# Patient Record
Sex: MALE | Race: WHITE | Employment: FULL TIME | URBAN - NONMETROPOLITAN AREA
[De-identification: names, ages, dates, MRNs, and addresses within clinical notes are randomized per-mention and may not be internally consistent; named-entity substitution may affect disease eponyms.]

---

## 2017-09-19 ENCOUNTER — TELEPHONE (OUTPATIENT)
Dept: SURGERY | Age: 37
End: 2017-09-19

## 2017-09-19 ENCOUNTER — HOSPITAL ENCOUNTER (OUTPATIENT)
Dept: LAB | Age: 37
Setting detail: SPECIMEN
Discharge: HOME | End: 2017-09-19
Payer: COMMERCIAL

## 2017-09-19 ENCOUNTER — INITIAL CONSULT (OUTPATIENT)
Dept: SURGERY | Age: 37
End: 2017-09-19
Payer: COMMERCIAL

## 2017-09-19 ENCOUNTER — HOSPITAL ENCOUNTER (OUTPATIENT)
Dept: NON INVASIVE DIAGNOSTICS | Age: 37
Discharge: HOME | End: 2017-09-19
Payer: COMMERCIAL

## 2017-09-19 ENCOUNTER — HOSPITAL ENCOUNTER (OUTPATIENT)
Dept: GENERAL RADIOLOGY | Age: 37
Discharge: HOME | End: 2017-09-19
Payer: COMMERCIAL

## 2017-09-19 VITALS
HEIGHT: 75 IN | BODY MASS INDEX: 29.97 KG/M2 | HEART RATE: 94 BPM | WEIGHT: 241 LBS | SYSTOLIC BLOOD PRESSURE: 130 MMHG | DIASTOLIC BLOOD PRESSURE: 90 MMHG

## 2017-09-19 DIAGNOSIS — F17.200 SMOKER: ICD-10-CM

## 2017-09-19 DIAGNOSIS — Z01.818 PREOP TESTING: ICD-10-CM

## 2017-09-19 DIAGNOSIS — F17.200 SMOKER: Primary | ICD-10-CM

## 2017-09-19 DIAGNOSIS — K40.90 LEFT INGUINAL HERNIA: ICD-10-CM

## 2017-09-19 LAB
ALBUMIN SERPL-MCNC: 5 G/DL (ref 3.5–5.2)
ALBUMIN/GLOBULIN RATIO: 1.8 (ref 1–2.5)
ALP BLD-CCNC: 87 U/L (ref 40–129)
ALT SERPL-CCNC: 54 U/L (ref 5–41)
ANION GAP SERPL CALCULATED.3IONS-SCNC: 17 MMOL/L (ref 9–17)
AST SERPL-CCNC: 35 U/L
BILIRUB SERPL-MCNC: 0.36 MG/DL (ref 0.3–1.2)
BUN BLDV-MCNC: 16 MG/DL (ref 6–20)
BUN/CREAT BLD: 22 (ref 9–20)
CALCIUM SERPL-MCNC: 9.3 MG/DL (ref 8.6–10.4)
CHLORIDE BLD-SCNC: 99 MMOL/L (ref 98–107)
CO2: 23 MMOL/L (ref 20–31)
CREAT SERPL-MCNC: 0.72 MG/DL (ref 0.7–1.2)
GFR AFRICAN AMERICAN: >60 ML/MIN
GFR NON-AFRICAN AMERICAN: >60 ML/MIN
GFR SERPL CREATININE-BSD FRML MDRD: ABNORMAL ML/MIN/{1.73_M2}
GFR SERPL CREATININE-BSD FRML MDRD: ABNORMAL ML/MIN/{1.73_M2}
GLUCOSE BLD-MCNC: 88 MG/DL (ref 70–99)
HCT VFR BLD CALC: 48.2 % (ref 41–53)
HEMOGLOBIN: 16.3 G/DL (ref 13.5–17.5)
MCH RBC QN AUTO: 31.5 PG (ref 26–34)
MCHC RBC AUTO-ENTMCNC: 33.8 G/DL (ref 31–37)
MCV RBC AUTO: 93.3 FL (ref 80–100)
PDW BLD-RTO: 12.9 % (ref 11–14.5)
PLATELET # BLD: 190 K/UL (ref 140–450)
PMV BLD AUTO: 9.6 FL (ref 6–12)
POTASSIUM SERPL-SCNC: 4 MMOL/L (ref 3.7–5.3)
RBC # BLD: 5.17 M/UL (ref 4.5–5.9)
SODIUM BLD-SCNC: 139 MMOL/L (ref 135–144)
TOTAL PROTEIN: 7.8 G/DL (ref 6.4–8.3)
WBC # BLD: 7 K/UL (ref 3.5–11)

## 2017-09-19 PROCEDURE — 99214 OFFICE O/P EST MOD 30 MIN: CPT | Performed by: SURGERY

## 2017-09-19 PROCEDURE — 36415 COLL VENOUS BLD VENIPUNCTURE: CPT

## 2017-09-19 PROCEDURE — 93005 ELECTROCARDIOGRAM TRACING: CPT

## 2017-09-19 PROCEDURE — 85027 COMPLETE CBC AUTOMATED: CPT

## 2017-09-19 PROCEDURE — 80053 COMPREHEN METABOLIC PANEL: CPT

## 2017-09-19 PROCEDURE — 71020 XR CHEST STANDARD TWO VW: CPT

## 2017-09-19 RX ORDER — IBUPROFEN 200 MG
600 TABLET ORAL PRN
COMMUNITY

## 2017-09-19 NOTE — TELEPHONE ENCOUNTER
[facility]    Pre-Operative Evaluation/Consultation    Name:  Sukhdev Meléndez                                         Age:  40 y.o. MRN:  [id number]       :  1980   Date:  2017         Sex: male    There were no encounter diagnoses. Surgeon:  Dr. Herberth Grider  Procedure (Planned):  laparoscopic recurrent left ing hernia  Date Scheduled surgery: 2017    Attending : No att. providers found    Primary Physician:faustina  Cardiologist: None    Type of Anesthesia Requested: none    Patient Medical history:  No Known Allergies  There is no problem list on file for this patient. No past medical history on file. Past Surgical History:   Procedure Laterality Date    HERNIA REPAIR  2017    left inguinal hernia repair. Dr Shadia Martinez  2017    recurrent left inguinal hernia repair. Dr. Shadia Martinez      umbilical/right groin    TONSILLECTOMY       Social History   Substance Use Topics    Smoking status: Current Every Day Smoker     Packs/day: 0.50    Smokeless tobacco: Not on file    Alcohol use Yes      Comment: couple times week     Medications:  Current Outpatient Prescriptions   Medication Sig Dispense Refill    ibuprofen (ADVIL;MOTRIN) 200 MG tablet Take 600 mg by mouth as needed for Pain       No current facility-administered medications for this visit. Scheduled Meds:  Continuous Infusions:  PRN Meds:. Prior to Admission medications    Medication Sig Start Date End Date Taking? Authorizing Provider   ibuprofen (ADVIL;MOTRIN) 200 MG tablet Take 600 mg by mouth as needed for Pain    Historical Provider, MD     Vital Signs (Current) [unfilled]    Weight:   Wt Readings from Last 1 Encounters:   17 241 lb (109.3 kg)     Height:   Ht Readings from Last 1 Encounters:   17 6' 3\" (1.905 m)      BMI:  There is no height or weight on file to calculate BMI.   Estimated body mass index is 30.12 kg/(m^2) as calculated from the

## 2017-09-22 LAB
EKG ATRIAL RATE: 65 BPM
EKG P AXIS: 29 DEGREES
EKG P-R INTERVAL: 172 MS
EKG Q-T INTERVAL: 420 MS
EKG QRS DURATION: 100 MS
EKG QTC CALCULATION (BAZETT): 436 MS
EKG R AXIS: -40 DEGREES
EKG T AXIS: 18 DEGREES
EKG VENTRICULAR RATE: 65 BPM

## 2017-09-26 ENCOUNTER — ANESTHESIA EVENT (OUTPATIENT)
Dept: OPERATING ROOM | Age: 37
End: 2017-09-26
Payer: COMMERCIAL

## 2017-09-27 ENCOUNTER — HOSPITAL ENCOUNTER (OUTPATIENT)
Age: 37
Setting detail: OUTPATIENT SURGERY
Discharge: HOME | End: 2017-09-27
Attending: SURGERY | Admitting: SURGERY
Payer: COMMERCIAL

## 2017-09-27 ENCOUNTER — ANESTHESIA (OUTPATIENT)
Dept: OPERATING ROOM | Age: 37
End: 2017-09-27
Payer: COMMERCIAL

## 2017-09-27 VITALS
OXYGEN SATURATION: 96 % | TEMPERATURE: 98.9 F | BODY MASS INDEX: 30.04 KG/M2 | HEART RATE: 82 BPM | SYSTOLIC BLOOD PRESSURE: 147 MMHG | RESPIRATION RATE: 16 BRPM | HEIGHT: 75 IN | DIASTOLIC BLOOD PRESSURE: 87 MMHG | WEIGHT: 241.6 LBS

## 2017-09-27 VITALS — DIASTOLIC BLOOD PRESSURE: 111 MMHG | TEMPERATURE: 79.2 F | SYSTOLIC BLOOD PRESSURE: 158 MMHG | OXYGEN SATURATION: 98 %

## 2017-09-27 PROCEDURE — 6360000002 HC RX W HCPCS: Performed by: SURGERY

## 2017-09-27 PROCEDURE — 2500000003 HC RX 250 WO HCPCS: Performed by: SURGERY

## 2017-09-27 PROCEDURE — 00830 ANES HERNIA RPR LWR ABD NOS: CPT | Performed by: NURSE ANESTHETIST, CERTIFIED REGISTERED

## 2017-09-27 PROCEDURE — 2720000010 HC SURG SUPPLY STERILE: Performed by: SURGERY

## 2017-09-27 PROCEDURE — 3700000000 HC ANESTHESIA ATTENDED CARE: Performed by: SURGERY

## 2017-09-27 PROCEDURE — 7100000000 HC PACU RECOVERY - FIRST 15 MIN: Performed by: SURGERY

## 2017-09-27 PROCEDURE — 7100000010 HC PHASE II RECOVERY - FIRST 15 MIN: Performed by: SURGERY

## 2017-09-27 PROCEDURE — 3600000004 HC SURGERY LEVEL 4 BASE: Performed by: SURGERY

## 2017-09-27 PROCEDURE — 7100000011 HC PHASE II RECOVERY - ADDTL 15 MIN: Performed by: SURGERY

## 2017-09-27 PROCEDURE — 2580000003 HC RX 258: Performed by: SURGERY

## 2017-09-27 PROCEDURE — 64486 TAP BLOCK UNIL BY INJECTION: CPT | Performed by: NURSE ANESTHETIST, CERTIFIED REGISTERED

## 2017-09-27 PROCEDURE — 49651 LAP ING HERNIA REPAIR RECUR: CPT | Performed by: SURGERY

## 2017-09-27 PROCEDURE — 3700000001 HC ADD 15 MINUTES (ANESTHESIA): Performed by: SURGERY

## 2017-09-27 PROCEDURE — 3600000014 HC SURGERY LEVEL 4 ADDTL 15MIN: Performed by: SURGERY

## 2017-09-27 PROCEDURE — 6360000002 HC RX W HCPCS: Performed by: NURSE ANESTHETIST, CERTIFIED REGISTERED

## 2017-09-27 PROCEDURE — 2780000010 HC IMPLANT OTHER: Performed by: SURGERY

## 2017-09-27 PROCEDURE — C1781 MESH (IMPLANTABLE): HCPCS | Performed by: SURGERY

## 2017-09-27 PROCEDURE — 7100000001 HC PACU RECOVERY - ADDTL 15 MIN: Performed by: SURGERY

## 2017-09-27 PROCEDURE — 2500000003 HC RX 250 WO HCPCS: Performed by: NURSE ANESTHETIST, CERTIFIED REGISTERED

## 2017-09-27 DEVICE — MESH HERN L W10.8XL16CM L INGUINAL WHT POLYPR MFIL: Type: IMPLANTABLE DEVICE | Site: INGUINAL | Status: FUNCTIONAL

## 2017-09-27 RX ORDER — ONDANSETRON 2 MG/ML
INJECTION INTRAMUSCULAR; INTRAVENOUS PRN
Status: DISCONTINUED | OUTPATIENT
Start: 2017-09-27 | End: 2017-09-27 | Stop reason: SDUPTHER

## 2017-09-27 RX ORDER — KETOROLAC TROMETHAMINE 30 MG/ML
INJECTION, SOLUTION INTRAMUSCULAR; INTRAVENOUS PRN
Status: DISCONTINUED | OUTPATIENT
Start: 2017-09-27 | End: 2017-09-27 | Stop reason: SDUPTHER

## 2017-09-27 RX ORDER — ROCURONIUM BROMIDE 10 MG/ML
INJECTION, SOLUTION INTRAVENOUS PRN
Status: DISCONTINUED | OUTPATIENT
Start: 2017-09-27 | End: 2017-09-27 | Stop reason: SDUPTHER

## 2017-09-27 RX ORDER — PROPOFOL 10 MG/ML
INJECTION, EMULSION INTRAVENOUS PRN
Status: DISCONTINUED | OUTPATIENT
Start: 2017-09-27 | End: 2017-09-27 | Stop reason: SDUPTHER

## 2017-09-27 RX ORDER — HYDROCODONE BITARTRATE AND ACETAMINOPHEN 5; 325 MG/1; MG/1
1 TABLET ORAL EVERY 6 HOURS PRN
Qty: 30 TABLET | Refills: 0 | Status: SHIPPED | OUTPATIENT
Start: 2017-09-27 | End: 2022-08-23

## 2017-09-27 RX ORDER — FENTANYL CITRATE 50 UG/ML
INJECTION, SOLUTION INTRAMUSCULAR; INTRAVENOUS PRN
Status: DISCONTINUED | OUTPATIENT
Start: 2017-09-27 | End: 2017-09-27 | Stop reason: SDUPTHER

## 2017-09-27 RX ORDER — SODIUM CHLORIDE, SODIUM LACTATE, POTASSIUM CHLORIDE, CALCIUM CHLORIDE 600; 310; 30; 20 MG/100ML; MG/100ML; MG/100ML; MG/100ML
INJECTION, SOLUTION INTRAVENOUS CONTINUOUS
Status: DISCONTINUED | OUTPATIENT
Start: 2017-09-27 | End: 2017-09-27 | Stop reason: HOSPADM

## 2017-09-27 RX ORDER — BUPIVACAINE HYDROCHLORIDE 5 MG/ML
INJECTION, SOLUTION EPIDURAL; INTRACAUDAL PRN
Status: DISCONTINUED | OUTPATIENT
Start: 2017-09-27 | End: 2017-09-27 | Stop reason: SDUPTHER

## 2017-09-27 RX ORDER — GLYCOPYRROLATE 0.2 MG/ML
INJECTION INTRAMUSCULAR; INTRAVENOUS PRN
Status: DISCONTINUED | OUTPATIENT
Start: 2017-09-27 | End: 2017-09-27 | Stop reason: SDUPTHER

## 2017-09-27 RX ORDER — METOPROLOL TARTRATE 5 MG/5ML
INJECTION INTRAVENOUS PRN
Status: DISCONTINUED | OUTPATIENT
Start: 2017-09-27 | End: 2017-09-27 | Stop reason: SDUPTHER

## 2017-09-27 RX ORDER — MIDAZOLAM HYDROCHLORIDE 1 MG/ML
INJECTION INTRAMUSCULAR; INTRAVENOUS PRN
Status: DISCONTINUED | OUTPATIENT
Start: 2017-09-27 | End: 2017-09-27 | Stop reason: SDUPTHER

## 2017-09-27 RX ORDER — DEXAMETHASONE SODIUM PHOSPHATE 4 MG/ML
INJECTION, SOLUTION INTRA-ARTICULAR; INTRALESIONAL; INTRAMUSCULAR; INTRAVENOUS; SOFT TISSUE PRN
Status: DISCONTINUED | OUTPATIENT
Start: 2017-09-27 | End: 2017-09-27 | Stop reason: SDUPTHER

## 2017-09-27 RX ADMIN — GLYCOPYRROLATE 0.2 MG: 0.2 INJECTION INTRAMUSCULAR; INTRAVENOUS at 11:44

## 2017-09-27 RX ADMIN — MIDAZOLAM HYDROCHLORIDE 2 MG: 1 INJECTION, SOLUTION INTRAMUSCULAR; INTRAVENOUS at 09:15

## 2017-09-27 RX ADMIN — NEOSTIGMINE METHYLSULFATE 2 MG: 1 INJECTION INTRAMUSCULAR; INTRAVENOUS; SUBCUTANEOUS at 11:44

## 2017-09-27 RX ADMIN — PROPOFOL 200 MG: 10 INJECTION, EMULSION INTRAVENOUS at 09:26

## 2017-09-27 RX ADMIN — SODIUM CHLORIDE, POTASSIUM CHLORIDE, SODIUM LACTATE AND CALCIUM CHLORIDE: 600; 310; 30; 20 INJECTION, SOLUTION INTRAVENOUS at 08:47

## 2017-09-27 RX ADMIN — ROCURONIUM BROMIDE 50 MG: 10 INJECTION INTRAVENOUS at 09:26

## 2017-09-27 RX ADMIN — GLYCOPYRROLATE 0.2 MG: 0.2 INJECTION INTRAMUSCULAR; INTRAVENOUS at 09:15

## 2017-09-27 RX ADMIN — ROCURONIUM BROMIDE 20 MG: 10 INJECTION INTRAVENOUS at 09:57

## 2017-09-27 RX ADMIN — BUPIVACAINE HYDROCHLORIDE 30 ML: 5 INJECTION, SOLUTION EPIDURAL; INTRACAUDAL at 12:17

## 2017-09-27 RX ADMIN — KETOROLAC TROMETHAMINE 30 MG: 30 INJECTION, SOLUTION INTRAMUSCULAR; INTRAVENOUS at 11:43

## 2017-09-27 RX ADMIN — SODIUM CHLORIDE, POTASSIUM CHLORIDE, SODIUM LACTATE AND CALCIUM CHLORIDE: 600; 310; 30; 20 INJECTION, SOLUTION INTRAVENOUS at 11:15

## 2017-09-27 RX ADMIN — Medication 2 G: at 09:30

## 2017-09-27 RX ADMIN — ONDANSETRON 4 MG: 2 INJECTION INTRAMUSCULAR; INTRAVENOUS at 11:42

## 2017-09-27 RX ADMIN — ENOXAPARIN SODIUM 40 MG: 40 INJECTION SUBCUTANEOUS at 09:04

## 2017-09-27 RX ADMIN — DEXAMETHASONE SODIUM PHOSPHATE 4 MG: 4 INJECTION, SOLUTION INTRAMUSCULAR; INTRAVENOUS at 11:42

## 2017-09-27 RX ADMIN — SODIUM CHLORIDE, POTASSIUM CHLORIDE, SODIUM LACTATE AND CALCIUM CHLORIDE: 600; 310; 30; 20 INJECTION, SOLUTION INTRAVENOUS at 09:15

## 2017-09-27 RX ADMIN — METOPROLOL TARTRATE 2.5 MG: 5 INJECTION, SOLUTION INTRAVENOUS at 10:05

## 2017-09-27 RX ADMIN — METOPROLOL TARTRATE 2.5 MG: 5 INJECTION, SOLUTION INTRAVENOUS at 10:07

## 2017-09-27 RX ADMIN — DEXAMETHASONE SODIUM PHOSPHATE 8 MG: 4 INJECTION, SOLUTION INTRAMUSCULAR; INTRAVENOUS at 12:17

## 2017-09-27 RX ADMIN — FENTANYL CITRATE 300 MCG: 50 INJECTION, SOLUTION INTRAMUSCULAR; INTRAVENOUS at 09:26

## 2017-09-27 RX ADMIN — SODIUM CHLORIDE, POTASSIUM CHLORIDE, SODIUM LACTATE AND CALCIUM CHLORIDE: 600; 310; 30; 20 INJECTION, SOLUTION INTRAVENOUS at 10:02

## 2017-09-27 ASSESSMENT — PULMONARY FUNCTION TESTS
PIF_VALUE: 36
PIF_VALUE: 28
PIF_VALUE: 32
PIF_VALUE: 30
PIF_VALUE: 37
PIF_VALUE: 28
PIF_VALUE: 37
PIF_VALUE: 55
PIF_VALUE: 29
PIF_VALUE: 29
PIF_VALUE: 36
PIF_VALUE: 28
PIF_VALUE: 37
PIF_VALUE: 32
PIF_VALUE: 34
PIF_VALUE: 35
PIF_VALUE: 26
PIF_VALUE: 28
PIF_VALUE: 30
PIF_VALUE: 28
PIF_VALUE: 37
PIF_VALUE: 37
PIF_VALUE: 35
PIF_VALUE: 28
PIF_VALUE: 36
PIF_VALUE: 37
PIF_VALUE: 29
PIF_VALUE: 28
PIF_VALUE: 28
PIF_VALUE: 19
PIF_VALUE: 38
PIF_VALUE: 3
PIF_VALUE: 35
PIF_VALUE: 9
PIF_VALUE: 3
PIF_VALUE: 3
PIF_VALUE: 37
PIF_VALUE: 35
PIF_VALUE: 29
PIF_VALUE: 37
PIF_VALUE: 32
PIF_VALUE: 31
PIF_VALUE: 34
PIF_VALUE: 29
PIF_VALUE: 28
PIF_VALUE: 33
PIF_VALUE: 33
PIF_VALUE: 36
PIF_VALUE: 35
PIF_VALUE: 37
PIF_VALUE: 30
PIF_VALUE: 1
PIF_VALUE: 25
PIF_VALUE: 27
PIF_VALUE: 33
PIF_VALUE: 29
PIF_VALUE: 29
PIF_VALUE: 28
PIF_VALUE: 29
PIF_VALUE: 36
PIF_VALUE: 37
PIF_VALUE: 36
PIF_VALUE: 37
PIF_VALUE: 32
PIF_VALUE: 32
PIF_VALUE: 9
PIF_VALUE: 21
PIF_VALUE: 28
PIF_VALUE: 36
PIF_VALUE: 34
PIF_VALUE: 28
PIF_VALUE: 31
PIF_VALUE: 37
PIF_VALUE: 36
PIF_VALUE: 31
PIF_VALUE: 36
PIF_VALUE: 31
PIF_VALUE: 28
PIF_VALUE: 28
PIF_VALUE: 32
PIF_VALUE: 36
PIF_VALUE: 4
PIF_VALUE: 34
PIF_VALUE: 37
PIF_VALUE: 36
PIF_VALUE: 37
PIF_VALUE: 34
PIF_VALUE: 29
PIF_VALUE: 35
PIF_VALUE: 28
PIF_VALUE: 37
PIF_VALUE: 28
PIF_VALUE: 35
PIF_VALUE: 37
PIF_VALUE: 36
PIF_VALUE: 0
PIF_VALUE: 31
PIF_VALUE: 36
PIF_VALUE: 37
PIF_VALUE: 33
PIF_VALUE: 29
PIF_VALUE: 36
PIF_VALUE: 28
PIF_VALUE: 27
PIF_VALUE: 28
PIF_VALUE: 28
PIF_VALUE: 37
PIF_VALUE: 36
PIF_VALUE: 29
PIF_VALUE: 26
PIF_VALUE: 37
PIF_VALUE: 33
PIF_VALUE: 31
PIF_VALUE: 31
PIF_VALUE: 32
PIF_VALUE: 2
PIF_VALUE: 36
PIF_VALUE: 36
PIF_VALUE: 29
PIF_VALUE: 34
PIF_VALUE: 20
PIF_VALUE: 37
PIF_VALUE: 29
PIF_VALUE: 36
PIF_VALUE: 28
PIF_VALUE: 29
PIF_VALUE: 37
PIF_VALUE: 28
PIF_VALUE: 36
PIF_VALUE: 28
PIF_VALUE: 37
PIF_VALUE: 37
PIF_VALUE: 29
PIF_VALUE: 36
PIF_VALUE: 35
PIF_VALUE: 20
PIF_VALUE: 31
PIF_VALUE: 3
PIF_VALUE: 31
PIF_VALUE: 36
PIF_VALUE: 26
PIF_VALUE: 29
PIF_VALUE: 34
PIF_VALUE: 31
PIF_VALUE: 29
PIF_VALUE: 37
PIF_VALUE: 37
PIF_VALUE: 36
PIF_VALUE: 21
PIF_VALUE: 29
PIF_VALUE: 36
PIF_VALUE: 37
PIF_VALUE: 34
PIF_VALUE: 28
PIF_VALUE: 29
PIF_VALUE: 37
PIF_VALUE: 36
PIF_VALUE: 28
PIF_VALUE: 21
PIF_VALUE: 37
PIF_VALUE: 37
PIF_VALUE: 29
PIF_VALUE: 28
PIF_VALUE: 29
PIF_VALUE: 28
PIF_VALUE: 21
PIF_VALUE: 37
PIF_VALUE: 31
PIF_VALUE: 28
PIF_VALUE: 37
PIF_VALUE: 30
PIF_VALUE: 37
PIF_VALUE: 33
PIF_VALUE: 26
PIF_VALUE: 36
PIF_VALUE: 36
PIF_VALUE: 28

## 2017-09-27 ASSESSMENT — PAIN SCALES - GENERAL
PAINLEVEL_OUTOF10: 0
PAINLEVEL_OUTOF10: 0
PAINLEVEL_OUTOF10: 2
PAINLEVEL_OUTOF10: 3
PAINLEVEL_OUTOF10: 3
PAINLEVEL_OUTOF10: 0

## 2017-09-27 ASSESSMENT — PAIN - FUNCTIONAL ASSESSMENT: PAIN_FUNCTIONAL_ASSESSMENT: 0-10

## 2017-09-27 ASSESSMENT — PAIN DESCRIPTION - PAIN TYPE: TYPE: ACUTE PAIN;SURGICAL PAIN

## 2017-09-27 ASSESSMENT — PAIN DESCRIPTION - LOCATION: LOCATION: ABDOMEN

## 2017-09-27 ASSESSMENT — PAIN DESCRIPTION - ORIENTATION: ORIENTATION: RIGHT;LOWER;MID

## 2017-09-27 ASSESSMENT — PAIN DESCRIPTION - DESCRIPTORS: DESCRIPTORS: SORE

## 2017-09-27 NOTE — BRIEF OP NOTE
Brief Postoperative Note    Todd Forrest  YOB: 1980  [id number]    Pre-operative Diagnosis: 1. Second recurrence of left inguinal hernia, 2 previous open hernia repairs with mesh    Post-operative Diagnosis: Same    Procedure: 1. Laparoscopic inguinal left hernia repair of 2nd recurrence, using large left 3-D bard mesh    Anesthesia: General, local, block    Surgeons/Assistants: Dr. Musa Mora    Estimated Blood Loss: less than 50     Complications: None    Specimens: Was Not Obtained    Findings: a lot of scar tissue to the plug mesh. Hernia sac opened in order to disect off mesh.   Defect closed with 2-0 prolene sutue    Electronically signed by Tino Alberts MD on 9/27/2017 at 11:45 AM

## 2017-09-27 NOTE — IP AVS SNAPSHOT
After Visit Summary  (Discharge Instructions)    Medication List for Home    Based on the information you provided to us as well as any changes during this visit, the following is your updated medication list.  Compare this with your prescription bottles at home. If you have any questions or concerns, contact your primary care physician's office. Daily Medication List (This medication list can be shared with any healthcare provider who is helping you manage your medications)      There are NEW medications for you. START taking them after you leave the hospital        Last Dose    Next Dose Due AM NOON PM NIGHT    HYDROcodone-acetaminophen 5-325 MG per tablet   Commonly known as:  NORCO   Take 1 tablet by mouth every 6 hours as needed for Pain . When needed                           These are medications you told us you were taking at home, CONTINUE taking them after you leave the hospital        Last Dose    Next Dose Due AM NOON PM NIGHT    ibuprofen 200 MG tablet   Commonly known as:  ADVIL;MOTRIN   Take 600 mg by mouth as needed for Pain                                              Where to Get Your Medications      You can get these medications from any pharmacy     Bring a paper prescription for each of these medications     HYDROcodone-acetaminophen 5-325 MG per tablet               Allergies as of 9/27/2017     No Known Allergies      Immunizations as of 9/27/2017     No immunizations on file. Last Vitals          Most Recent Value    Temp  98.9 °F (37.2 °C)    Pulse  82    Resp  16    BP  (!)  143/97         After Visit Summary    This summary was created for you. Thank you for entrusting your care to us.   The following information includes details about your hospital/visit stay along with steps you should take to help with your recovery once you leave the hospital.  In this packet, you will find information about the topics listed below: · Instructions about your medications including a list of your home medications  · A summary of your hospital visit  · Follow-up appointments once you have left the hospital  · Your care plan at home      You may receive a survey regarding the care you received during your stay. Your input is valuable to us. We encourage you to complete and return your survey in the envelope provided. We hope you will choose us in the future for your healthcare needs. Patient Information     Patient Name VANDANA Hanley 1980      Care Provided at:     Name Address Phone       [facility] [address] Krist[address] Galan [phone]            Your Visit    Here you will find information about your visit, including the reason for your visit. Please take this sheet with you when you visit your doctor or other health care provider in the future. It will help determine the best possible medical care for you at that time. If you have any questions once you leave the hospital, please call the department phone number listed below. Why you were here     Your primary diagnosis was:  Not on File      Visit Information     Date & Time Provider Department Dept. Phone    2017 Ernie Paniagua MD [address] [facility]  [phone]       Follow-up Appointments    Below is a list of your follow-up and future appointments. This may not be a complete list as you may have made appointments directly with providers that we are not aware of or your providers may have made some for you. Please call your providers to confirm appointments. It is important to keep your appointments. Please bring your current insurance card, photo ID, co-pay, and all medication bottles to your appointment. If self-pay, payment is expected at the time of service.         Follow-up Information     Follow up with Mannie Laughlin MD.    Specialty:  General Surgery    Why:  As scheduled    Contact information: [address]  [phone]        Future Appointments     10/4/2017 10:00 AM     Appointment with Mic Owen at [address] Gen Surg ([phone])   If this is a fasting lab, please do not eat or drink past midnight other than water. Abdicarola Matt       10/17/2017 9:00 AM     Appointment with Adalid Benitez MD at [address] ([phone])   Noemi Gutierrez         Preventive Care        Date Due    HIV screening is recommended for all people regardless of risk factors  aged 15-65 years at least once (lifetime) who have never been HIV tested. 1/21/1996    Tetanus Combination Vaccine (1 - Tdap) 1/21/2000    Pneumococcal Vaccine - Pneumovax for adults aged 19-64 years with: chronic heart disease, chronic lung disease, diabetes mellitus, alcoholism, chronic liver disease, or cigarette smoking. (1 of 1 - PPSV23) 1/21/2000    Yearly Flu Vaccine (1) 9/1/2017                 Care Plan Once You Return Home    This section includes instructions you will need to follow once you leave the hospital.  Your care team will discuss these with you, so you and those caring for you know how to best care for your health needs at home. This section may also include educational information about certain health topics that may be of help to you. Discharge Instructions       1.  norco for pain # 30  2. May remove dressings and may shower in 24 hours  3. Reg diet  4. Activity, no lifting > 10 lbs for 3 weeks  5. Off work for 3 weeks  6. May use MOM or other laxative OTC to avoid constipation with pain meds  7. Follow up in 1 week with Dr. Natalya Leone nurse to have staples out  8. Follow up with Dr. Dashawn Griffiths in 2-3 weeks, to discuss back to work options.     Discharge Instructions following HERNIA REPAIR    Activity - In general, you should not plan on doing much for several days after your surgery, but you may resume any activity that is reasonably comfortable for you. · DO NOT DRIVE A CAR, OPERATE MACHINERY OR PARTICIPATE IN ANY ACTIVITY WHICH HAS A SIGNIFICANT RISK FOR INJURY for at least 24 hours following surgery or while taking narcotic medication. · Most people are able to return to work in approximately 2 weeks following surgery. NO HEAVY LIFTING for 6 weeks. Get specific instructions from your surgeon if your job requires heavy lifting/strenuous activity. Diet - You may resume your usual diet (including any previous restrictions) unless otherwise instructed. AVOID ALCOHOL WHILE TAKING PAIN MEDICATIONS. Medications - Unless otherwise instructed, resume all of your usual medications. In addition, you will be given a prescription for pain medication which should be taken only as labeled. Keep in mind that narcotic medications can cause constipation. You may wish to take an OTC stool softener. Wound Care - You may have stitches, staples and/or paper tapes over your incision(s). In general these need no special care, but you should do the following:  · Leave your bandages intact for 24 hours. After that time they may be removed and left off if desired. If your bandages get wet, replace them. · Avoid getting your incisions and bandages wet for 24 hours after surgery. · After 24 hours you may shower or sponge bathe, do not soak in a tub. · Do not scrub your incisions or apply ointments or lotions, unless otherwise instructed. · If you have paper tapes on your incision (steri-strips, butterfly bandages, etc.), you may get them wet. Typically they will loosen and fall off in 5-7 days. · You may apply bandages to your incision(s) if you wish. · No soaking in bath tub, hot tub or swimming until cleared by your surgeon. It is not unusual to have a small amount of clear or bloody drainage for the first day or two following surgery.   You should call if your incisions drain pus or creamy, foul-smelling fluid; or if the drainage persists in significant amounts longer than 48 hours. You should also call if your incision(s) become red, hot or painful. Follow-up - As scheduled. Notify your physician if you have any of the following problems:  · Increased redness, swelling or warmth at your incision sites. · Foul-smelling creamy/pus draining from incision sites. · Relentless pain, nausea, vomiting, bleeding or drainage. · Temperature >100.5    Problems / Questions - Call [phone] [facility]) or after hours call [phone] [facility])  [address] [facility]     · For men to help prevent scrotal swelling and pain, it is recommended that you wear firm fitting \"jockey-type\" underwear or a scrotal support. · Ice packs to the affected side are often helpful in reducing discomfort. A simple ice pack can be made by placing crushed ice in a sealable plastic bag and covering it with a hand towel. Heating pads should be avoided as they can make the swelling worse. It is not unusual for there to be quite a bit of bruising and swelling on the affected side, scrotal swelling and bruising as well. Important information for a smoker       SMOKING: QUIT SMOKING. THIS IS THE MOST IMPORTANT ACTION YOU CAN TAKE TO IMPROVE YOUR CURRENT AND FUTURE HEALTH. Call the [address] [place] [address] at [facility] ([phone])    Smoking harms nonsmokers. When nonsmokers are around people who smoke, they absorb nicotine, carbon monoxide, and other ingredients of tobacco smoke. DO NOT SMOKE AROUND CHILDREN     Children exposed to secondhand smoke are at an increased risk of:  Sudden Infant Death Syndrome (SIDS), acute respiratory infections, inflammation of the middle ear, and severe asthma. Over a longer time, it causes heart disease and lung cancer.   There is no safe level of exposure to secondhand smoke.                MyChart Signup     [facility] allows you to send messages to your doctor, view your test results, renew your prescriptions, schedule appointments, view visit notes, and more. How Do I Sign Up? 1. In your Internet browser, go to [email or web address]  2. Click on the Sign Up Now link in the Sign In box. You will see the New Member Sign Up page. 3. Enter your [employer] Access Code exactly as it appears below. You will not need to use this code after youve completed the sign-up process. If you do not sign up before the expiration date, you must request a new code. [employer] Access Code: [email or web address]  Expires: 11/26/2017  1:16 PM    4. Enter your Social Security Number ([id number]) and Date of Birth (mm/dd/yyyy) as indicated and click Submit. You will be taken to the next sign-up page. 5. Create a [facility] ID. This will be your [facility] login ID and cannot be changed, so think of one that is secure and easy to remember. 6. Create a [employer] password. You can change your password at any time. 7. Enter your Password Reset Question and Answer. This can be used at a later time if you forget your password. 8. Enter your e-mail address. You will receive e-mail notification when new information is available in [address]. 9. Click Sign Up. You can now view your medical record. Additional Information  If you have questions, please contact the physician practice where you receive care. Remember, [facility] is NOT to be used for urgent needs. For medical emergencies, dial 911. For questions regarding your [employer] account call [phone]. If you have a clinical question, please call your doctor's office. View your information online  ? Review your current list of  medications, immunization, and allergies. ? Review your future test results online . ?  Review your discharge instructions provided by your caregivers at discharge

## 2017-09-27 NOTE — IP AVS SNAPSHOT
Patient Information     Patient Name VANDANA Hoang 1980         This is your updated medication list to keep with you all times      TAKE these medications     HYDROcodone-acetaminophen 5-325 MG per tablet   Commonly known as:  NORCO   Take 1 tablet by mouth every 6 hours as needed for Pain .        ibuprofen 200 MG tablet   Commonly known as:  ADVIL;MOTRIN

## 2017-09-27 NOTE — H&P
Nancy Orr is a 40 y.o. male              CC:  1. Recurrent LIH        HISTORY OF PRESENT ILLNESS:     Patient is a 72-year-old male who is here to discuss repair of a recurrent left inguinal hernia. In the last year. He has had an umbilical hernia repaired a right inguinal hernia repaired in the left inguinal hernia repaired. The left inguinal hernia reoccurred. He had it repaired for a second time and he got another recurrence. He is now sent to us to discuss possible laparoscopic left inguinal hernia repair. His umbilical hernia and right inguinal hernia repairs have been excellent. He has no problems and they have healed very well. The left just has been an issue. He lifts a little bit overweight at work but less than 50 pounds and isn't extremely stressful. He has not had trouble with healing and any other areas. He denies any other complaints of nausea, vomiting, diarrhea, constipation, chronic cough.         Review of Systems:     General:  Fever: Negative  Weight Change:Negative  Night Sweats: Negative     Eye:  Blurry Vision:Negative  Double Vision: Negative     Ent:  Headaches: Negative  Sore throat: Negative     Allergy/Immunology:  Hives: Negative  Latex allergy: Negative     Hematology/Lymphatic:  Bleeding Problems: Negative  Blood Clots: Negative  Swollen Lymph Nodes: Negative     Lungs:  Cough: Negative  SOB: Negative  Wheezing:Negative     Cardiovascular:  Chest Pain: Negative  Palpitations:Negative     GI:   Decreased Appetite: Negative  Heartburn: occational  Dysphagia: Negative  Nausea/Vomiting: Negative  Abdominal Pain: Negative  Change in Bowels:Negative  Constipation: Negative  Diarrhea: Negative  Rectal Bleeding: Negative     :   Dysuria: Negative  Increase Urinary Frequency/Urgency: Negative     Neuro:  Seizures: Negative  Confusion: Negative           PAST MEDICAL HISTORY:            Family History          Family History   Problem Relation Age of Onset    Heart Attack Father            Social History    Social History            Social History    Marital status:        Spouse name: N/A    Number of children: N/A    Years of education: N/A          Occupational History    Not on file. Social History Main Topics    Smoking status: Current Every Day Smoker       Packs/day: 0.50    Smokeless tobacco: Not on file    Alcohol use Yes          Comment: couple times week    Drug use: No    Sexual activity: Not on file           Other Topics Concern    Not on file      Social History Narrative          Past Surgical History          Past Surgical History:   Procedure Laterality Date    HERNIA REPAIR   05/2017     left inguinal hernia repair. Dr Anna Lai   06/2017     recurrent left inguinal hernia repair. Dr. Anna Lai   [id number]/9420     umbilical/right groin    TONSILLECTOMY              Past Medical History    History reviewed. No pertinent past medical history. No current outpatient prescriptions on file prior to visit. No current facility-administered medications on file prior to visit. Allergies as of 09/19/2017    (No Known Allergies)             PHYSICAL EXAM:    There were no vitals taken for this visit. Gen:  A and O x 3, NAD, well nourished  Eyes:  Sclera non icterus, PERRL  Lungs:  CTA, symmetrical  Chest:  RRR, no murmurs  Abd:  Soft, NT, ND, no HSM, no bruits     Hernia:  Umbilical area with no hernia, right groin no hernia. The left groin has a positive recurrent inguinal hernia- reducible. ASSESS MENT:     1. Second recurrenc of LIH:     I had a discussion with the patient that we often see this where hernias will work real well on the right side or the umbilical area and then fail on the other side of the groin. Is not related to the mesh or technique. It's just the particular condition of the muscle and surrounding tissue in that area. For some reason.   It's not holding a mesh plug very well. For that reason, Dr. María Zamarripa has asked that we evaluate him for a possible laparoscopic repair. This is a good technique for a recurrent hernia. It avoids going through the scar tissue and many times, we can avoid removing the mesh. This is more virgin tissue and we get behind the actual defect and completed a mesh in the muscle layers. We can have a good overlap. It doesn't reduce the risk of recurrence to zero but it should give us less of a chance of recurrence. If he should recur again, I think we need to consider checking his tissue for abnormalities in structure and possible referral to her hernia center. He understands this recommendation. I would like to go forward. We also discussed the possibility that we may not be able to do it laparoscopically and we may need to open and repaired again with an open technique. PLAN:     1. Proceed with repair laparoscopically of a second recurrence of the LIH.

## 2017-09-27 NOTE — PROGRESS NOTES
Discharged to home with friend. States he will have someone with him for the rest of the night. Denies other needs.

## 2017-10-03 NOTE — OP NOTE
OPERATIVE NOTE:        PRE OP DIAGNOSIS:  1. Second recurrence of left inguinal hernia, 2 previous open hernia repairs with mesh      POST OP DIAGNOSIS:  1.  same    OPERATION:  1. Laparoscopic inguinal left hernia repair of 2 nd recurrence, using large left 3-D bard mesh    SURGEON:  Dr. Warden Summers:  General with local      PROCEDURE:    Patient was taken to the OR and placed in a supine position with both arms tucked. The abdomen was prepped and draped in the usual sterile fashion. Local anesthetic was injected using 1%lidocaine with epi and .25% marcaine. A small incision was made with a #15 blade scalpel just to the left side of the umbilicus. This was taken down to the anterior rectus sheath. The anterior rectus sheath was incised and the posterior space was entered. The 10mm trocar was placed in this incision and into the posterior rectus space. The posterior rectus space was insufflated to 10 mm/Hg with CO2. The posterior space was bluntly dissected down to the symphysis pubis. Then a 5 mm trocar was placed in the midline about 5 cm above the symphysis pubis and another 5 mm trocar was placed 5 cm above that in the midline. The inferior epigastric vessels were identified and the dissection was carried out laterally with blunt dissectors. The findings include:  A very scarred sac up into the and internal inguinal hernia. The internal sac was definitely attached to the old mesh. We had to excise the sac away from the mesh. We then had to close the sac with a running #2 Prolene suture using a Lapra-Ty and intracorporeal or intra-post peritoneal space, suturing technique. We then had to put a second trocar in the left upper quadrant to vent the abdomen. We also looked into the area. We had just repair to make sure no bowel was included in the suture and it was not. 2.  Continue to clean the hernia him back past the curve of the vas deferens over the vessels.   We were then able to place the mesh appropriately. After the coopers ligament was dissected clean. The dissection was also carried laterally to the anterior iliac spine below the inferior epigastric vessels. The vas deferens was identified and the peritoneal sac was dissected back to where the vas crossed the iliac vessels medially. The gonadal vessels were identified and spared. A 3 D mesh was inserted through the 10 mm port and placed in correct position noted by the M on the mesh. 1 sorba tac was placed into the coopers lig. The mesh laid nicely. 20 cc of marcaine was injected into the posterior space. The trocars were removed and the posterior space was deflated. The fascia of the periumbilical incision was closed with a 0 vicryl figure of eight suture. The skin incisions were closed with 4-0 vicryl suture and steri strips were applied. Patient was taken back to the recovery room in stable condition.         Rozina Mcnamara MD

## 2017-10-04 ENCOUNTER — NURSE ONLY (OUTPATIENT)
Dept: SURGERY | Age: 37
End: 2017-10-04

## 2017-10-04 VITALS
BODY MASS INDEX: 30.34 KG/M2 | HEIGHT: 75 IN | DIASTOLIC BLOOD PRESSURE: 80 MMHG | SYSTOLIC BLOOD PRESSURE: 140 MMHG | TEMPERATURE: 98.7 F | HEART RATE: 88 BPM | WEIGHT: 244 LBS

## 2017-10-04 DIAGNOSIS — Z98.890 POST-OPERATIVE STATE: Primary | ICD-10-CM

## 2017-10-04 NOTE — MR AVS SNAPSHOT
After Visit Summary             Chente Ellis   10/4/2017 10:00 AM   Nurse Only    Description:  Male : 1980   Provider:  Kathryne Moritz   Department:  [facility] and Future Appointments         Below is a list of your follow-up and future appointments. This may not be a complete list as you may have made appointments directly with providers that we are not aware of or your providers may have made some for you. Please call your providers to confirm appointments. It is important to keep your appointments. Please bring your current insurance card, photo ID, co-pay, and all medication bottles to your appointment. If self-pay, payment is expected at the time of service. Your To-Do List     Future Appointments Provider Department Dept Phone    10/17/2017 9:00 AM Cee Jackson  [address] [facility] [phone]         Information from Your Visit        Department     Name Address Phone Fax    [address] [place] [phone]      Vital Signs     Smoking Status                   Current Every Day Smoker           Additional Information about your Body Mass Index (BMI)           Your BMI as listed above is considered obese (30 or more). BMI is an estimate of body fat, calculated from your height and weight. The higher your BMI, the greater your risk of heart disease, high blood pressure, type 2 diabetes, stroke, gallstones, arthritis, sleep apnea, and certain cancers. BMI is not perfect. It may overestimate body fat in athletes and people who are more muscular. Even a small weight loss (between 5 and 10 percent of your current weight) by decreasing your calorie intake and becoming more physically active will help lower your risk of developing or worsening diseases associated with obesity.      Learn more at: [email or web address].co.uk          Instructions Follow up with Dr Pawel Tovar on [id number]. Call our office if any problems or concerns. Medications and Orders      Your Current Medications Are              HYDROcodone-acetaminophen (NORCO) 5-325 MG per tablet Take 1 tablet by mouth every 6 hours as needed for Pain . ibuprofen (ADVIL;MOTRIN) 200 MG tablet Take 600 mg by mouth as needed for Pain      Allergies           No Known Allergies         Additional Information        Basic Information     Date Of Birth Sex Race Ethnicity Preferred Language    1980 Male White Non-/Non  English      Preventive Care        Date Due    HIV screening is recommended for all people regardless of risk factors  aged 15-65 years at least once (lifetime) who have never been HIV tested. 1/21/1996    Tetanus Combination Vaccine (1 - Tdap) 1/21/2000    Pneumococcal Vaccine - Pneumovax for adults aged 19-64 years with: chronic heart disease, chronic lung disease, diabetes mellitus, alcoholism, chronic liver disease, or cigarette smoking. (1 of 1 - PPSV23) 1/21/2000    Yearly Flu Vaccine (1) 9/1/2017            [employer] Signup           [employer] allows you to send messages to your doctor, view your test results, renew your prescriptions, schedule appointments, view visit notes, and more. How Do I Sign Up? 1. In your Internet browser, go to [email or web address]  2. Click on the Sign Up Now link in the Sign In box. You will see the New Member Sign Up page. 3. Enter your [employer] Access Code exactly as it appears below. You will not need to use this code after youve completed the sign-up process. If you do not sign up before the expiration date, you must request a new code. [employer] Access Code: [email or web address]  Expires: 11/26/2017  1:16 PM    4. Enter your Social Security Number ([id number]) and Date of Birth (mm/dd/yyyy) as indicated and click Submit. You will be taken to the next sign-up page.

## 2017-10-17 ENCOUNTER — OFFICE VISIT (OUTPATIENT)
Dept: SURGERY | Age: 37
End: 2017-10-17

## 2017-10-17 VITALS
BODY MASS INDEX: 30.71 KG/M2 | HEIGHT: 75 IN | TEMPERATURE: 98.3 F | SYSTOLIC BLOOD PRESSURE: 136 MMHG | WEIGHT: 247 LBS | HEART RATE: 80 BPM | DIASTOLIC BLOOD PRESSURE: 84 MMHG

## 2017-10-17 DIAGNOSIS — Z98.890 POST-OPERATIVE STATE: Primary | ICD-10-CM

## 2017-10-17 PROCEDURE — 99024 POSTOP FOLLOW-UP VISIT: CPT | Performed by: SURGERY

## 2022-06-22 ENCOUNTER — OFFICE VISIT (OUTPATIENT)
Dept: FAMILY MEDICINE CLINIC | Age: 42
End: 2022-06-22
Payer: COMMERCIAL

## 2022-06-22 VITALS
HEIGHT: 75 IN | DIASTOLIC BLOOD PRESSURE: 88 MMHG | RESPIRATION RATE: 16 BRPM | BODY MASS INDEX: 38.05 KG/M2 | WEIGHT: 306 LBS | HEART RATE: 95 BPM | OXYGEN SATURATION: 98 % | SYSTOLIC BLOOD PRESSURE: 130 MMHG

## 2022-06-22 DIAGNOSIS — E66.01 MORBID OBESITY DUE TO EXCESS CALORIES (HCC): ICD-10-CM

## 2022-06-22 DIAGNOSIS — R73.03 PREDIABETES: ICD-10-CM

## 2022-06-22 DIAGNOSIS — Z13.1 SCREENING FOR DIABETES MELLITUS: ICD-10-CM

## 2022-06-22 DIAGNOSIS — Z72.0 TOBACCO USE: ICD-10-CM

## 2022-06-22 DIAGNOSIS — S11.21XD TEAR OF ESOPHAGUS, SUBSEQUENT ENCOUNTER: ICD-10-CM

## 2022-06-22 DIAGNOSIS — I10 PRIMARY HYPERTENSION: Primary | ICD-10-CM

## 2022-06-22 DIAGNOSIS — G47.33 OSA (OBSTRUCTIVE SLEEP APNEA): ICD-10-CM

## 2022-06-22 PROBLEM — S11.21XA ESOPHAGEAL TEAR: Status: ACTIVE | Noted: 2022-06-22

## 2022-06-22 LAB — HBA1C MFR BLD: 5.7 %

## 2022-06-22 PROCEDURE — 83036 HEMOGLOBIN GLYCOSYLATED A1C: CPT | Performed by: FAMILY MEDICINE

## 2022-06-22 PROCEDURE — 99204 OFFICE O/P NEW MOD 45 MIN: CPT | Performed by: FAMILY MEDICINE

## 2022-06-22 RX ORDER — SUCRALFATE 1 G/1
TABLET ORAL
COMMUNITY
Start: 2022-06-05

## 2022-06-22 RX ORDER — OMEPRAZOLE 20 MG/1
20 CAPSULE, DELAYED RELEASE ORAL
COMMUNITY
Start: 2022-06-06 | End: 2022-07-29

## 2022-06-22 RX ORDER — NICOTINE 21 MG/24HR
1 PATCH, TRANSDERMAL 24 HOURS TRANSDERMAL EVERY 24 HOURS
Qty: 30 PATCH | Refills: 0 | Status: SHIPPED | OUTPATIENT
Start: 2022-06-22 | End: 2022-08-23

## 2022-06-22 RX ORDER — NIFEDIPINE 30 MG/1
30 TABLET, EXTENDED RELEASE ORAL DAILY
Qty: 30 TABLET | Refills: 2 | Status: SHIPPED | OUTPATIENT
Start: 2022-06-22 | End: 2022-08-23 | Stop reason: SDUPTHER

## 2022-06-22 RX ORDER — ACETAMINOPHEN 325 MG/1
325 TABLET ORAL EVERY 6 HOURS PRN
COMMUNITY
Start: 2022-06-05

## 2022-06-22 RX ORDER — NIFEDIPINE 30 MG/1
30 TABLET, EXTENDED RELEASE ORAL DAILY
COMMUNITY
Start: 2022-06-06 | End: 2022-06-22 | Stop reason: SDUPTHER

## 2022-06-22 SDOH — ECONOMIC STABILITY: FOOD INSECURITY: WITHIN THE PAST 12 MONTHS, YOU WORRIED THAT YOUR FOOD WOULD RUN OUT BEFORE YOU GOT MONEY TO BUY MORE.: NEVER TRUE

## 2022-06-22 SDOH — ECONOMIC STABILITY: FOOD INSECURITY: WITHIN THE PAST 12 MONTHS, THE FOOD YOU BOUGHT JUST DIDN'T LAST AND YOU DIDN'T HAVE MONEY TO GET MORE.: NEVER TRUE

## 2022-06-22 ASSESSMENT — ENCOUNTER SYMPTOMS
WHEEZING: 0
ABDOMINAL PAIN: 0
VOMITING: 0
NAUSEA: 0
BLOOD IN STOOL: 0
SHORTNESS OF BREATH: 0
RHINORRHEA: 0

## 2022-06-22 ASSESSMENT — PATIENT HEALTH QUESTIONNAIRE - PHQ9
SUM OF ALL RESPONSES TO PHQ QUESTIONS 1-9: 0
SUM OF ALL RESPONSES TO PHQ9 QUESTIONS 1 & 2: 0
2. FEELING DOWN, DEPRESSED OR HOPELESS: 0
SUM OF ALL RESPONSES TO PHQ QUESTIONS 1-9: 0
1. LITTLE INTEREST OR PLEASURE IN DOING THINGS: 0

## 2022-06-22 ASSESSMENT — SOCIAL DETERMINANTS OF HEALTH (SDOH): HOW HARD IS IT FOR YOU TO PAY FOR THE VERY BASICS LIKE FOOD, HOUSING, MEDICAL CARE, AND HEATING?: NOT VERY HARD

## 2022-06-22 NOTE — PROGRESS NOTES
Tahira Barros (:  1980) is a 40 y.o. male,New patient, here for evaluation of the following chief complaint(s):  Established New Doctor (Had tear in Esophagus not fully through so no surgery.)         ASSESSMENT/PLAN:  1. Primary hypertension  Comments:  Continue nifedipine. Recheck blood pressure with office visit in 2 months. Weight loss discussed. Orders:  -     NIFEdipine (PROCARDIA XL) 30 MG extended release tablet; Take 1 tablet by mouth daily, Disp-30 tablet, R-2Normal  2. Prediabetes  Comments:  Weight loss of 15 to 20 pounds to prevent diabetes; patient agrees. 3. Morbid obesity due to excess calories ([address]  Comments:  Exercise and diet control to lose weight. 4. Tear of esophagus, subsequent encounter  Comments:  Continue omeprazole and Carafate until medication completed then discontinue. Quit smoking. Limit use of NSAIDs. 5. Tobacco use  Comments:  Establish quit day and start nicotine patches. Orders:  -     nicotine (NICODERM CQ) 21 MG/24HR; Place 1 patch onto the skin every 24 hours, Disp-30 patch, R-0Normal  6. BARBARA (obstructive sleep apnea)  Comments:  Home sleep study ordered to determine settings. Weight loss discussed. Orders:  -     Home Sleep Study; Future  7. Screening for diabetes mellitus  Comments:  Hemoglobin A1c 5.7; weight loss to prevent diabetes. Orders:  -     POCT Hb A1C (glycosylated hemoglobin)      Return in about 2 months (around 2022). Subjective   SUBJECTIVE/OBJECTIVE:  The patient was hospitalized 2022 for what eventually proved to be an esophageal tear. He was treated medically and his symptoms have resolved. During the hospitalization he was found to have hypertension and he was started on nifedipine. He denies any side effects from the medication. He was also diagnosed with sleep apnea and he slept much better with a CPAP machine. He would like to continue this treatment as an outpatient. He does smoke 1/2 pack/day.   He has gained weight over the past year. He has never been diagnosed as having diabetes nor is there diabetes in the family. He denies any polyuria. Review of Systems   Constitutional: Positive for unexpected weight change. Negative for appetite change, fatigue and fever. HENT: Negative for congestion and rhinorrhea. Respiratory: Negative for shortness of breath and wheezing. Cardiovascular: Negative for chest pain and palpitations. Gastrointestinal: Negative for abdominal pain, blood in stool, nausea and vomiting. Endocrine: Negative for polydipsia and polyuria. Genitourinary: Negative for dysuria and hematuria. Skin: Negative for pallor. Neurological: Negative for dizziness, seizures and headaches. Hematological: Negative for adenopathy. Does not bruise/bleed easily. Psychiatric/Behavioral: Positive for sleep disturbance. Negative for dysphoric mood. The patient is not nervous/anxious. Objective   Physical Exam  Constitutional:       General: He is not in acute distress. Appearance: He is obese. HENT:      Head: Normocephalic. Eyes:      Extraocular Movements: Extraocular movements intact. Pupils: Pupils are equal, round, and reactive to light. Cardiovascular:      Rate and Rhythm: Normal rate and regular rhythm. Heart sounds: No murmur heard. Pulmonary:      Effort: Pulmonary effort is normal.      Breath sounds: Normal breath sounds. Abdominal:      General: Abdomen is flat. Palpations: Abdomen is soft. There is no mass. Tenderness: There is no abdominal tenderness. Musculoskeletal:         General: No swelling. Cervical back: Neck supple. Neurological:      General: No focal deficit present. Mental Status: He is alert and oriented to person, place, and time. Psychiatric:         Mood and Affect: Mood normal.         Behavior: Behavior normal.              An electronic signature was used to authenticate this note.     --MY

## 2022-08-23 ENCOUNTER — OFFICE VISIT (OUTPATIENT)
Dept: FAMILY MEDICINE CLINIC | Age: 42
End: 2022-08-23
Payer: COMMERCIAL

## 2022-08-23 VITALS
BODY MASS INDEX: 38.62 KG/M2 | SYSTOLIC BLOOD PRESSURE: 128 MMHG | OXYGEN SATURATION: 97 % | DIASTOLIC BLOOD PRESSURE: 84 MMHG | WEIGHT: 309 LBS | HEART RATE: 100 BPM | RESPIRATION RATE: 18 BRPM

## 2022-08-23 DIAGNOSIS — R73.03 PREDIABETES: ICD-10-CM

## 2022-08-23 DIAGNOSIS — M25.562 LEFT MEDIAL KNEE PAIN: Primary | ICD-10-CM

## 2022-08-23 DIAGNOSIS — R20.8 DYSESTHESIA AFFECTING BOTH SIDES OF BODY: ICD-10-CM

## 2022-08-23 DIAGNOSIS — E78.6 LOW HDL (UNDER 40): ICD-10-CM

## 2022-08-23 DIAGNOSIS — I10 PRIMARY HYPERTENSION: ICD-10-CM

## 2022-08-23 LAB
ALBUMIN/GLOBULIN RATIO: 1.4 G/DL
ALBUMIN: 4.3 G/DL (ref 3.5–5)
ALP BLD-CCNC: 91 UNITS/L (ref 38–126)
ALT SERPL-CCNC: 45 UNITS/L (ref 4–50)
ANION GAP SERPL CALCULATED.3IONS-SCNC: 14.5 MMOL/L
AST SERPL-CCNC: 35 UNITS/L (ref 17–59)
BILIRUB SERPL-MCNC: 0.5 MG/DL (ref 0.2–1.3)
BUN BLDV-MCNC: 9 MG/DL (ref 9–20)
CALCIUM SERPL-MCNC: 9.2 MG/DL (ref 8.4–10.2)
CHLORIDE BLD-SCNC: 104 MMOL/L (ref 98–120)
CHOLESTEROL/HDL RATIO: 5.61 RATIO (ref 0–4.5)
CHOLESTEROL: 202 MG/DL (ref 50–200)
CO2: 24 MMOL/L (ref 22–31)
CREAT SERPL-MCNC: 0.7 MG/DL (ref 0.7–1.3)
GFR CALCULATED: > 60
GLOBULIN: 3 G/DL
GLUCOSE: 162 MG/DL (ref 75–110)
HDLC SERPL-MCNC: 36 MG/DL (ref 36–68)
LDL CHOLESTEROL CALCULATED: 85.8 MG/DL (ref 0–160)
POTASSIUM SERPL-SCNC: 3.5 MMOL/L (ref 3.6–5)
SODIUM BLD-SCNC: 139 MMOL/L (ref 135–145)
TOTAL PROTEIN, SERUM: 7.4 G/DL (ref 6.3–8.2)
TRIGL SERPL-MCNC: 401 MG/DL (ref 10–250)
VLDLC SERPL CALC-MCNC: 80.2 MG/DL (ref 0–50)

## 2022-08-23 PROCEDURE — 99214 OFFICE O/P EST MOD 30 MIN: CPT | Performed by: FAMILY MEDICINE

## 2022-08-23 RX ORDER — NIFEDIPINE 30 MG/1
30 TABLET, EXTENDED RELEASE ORAL DAILY
Qty: 90 TABLET | Refills: 1 | Status: SHIPPED | OUTPATIENT
Start: 2022-08-23

## 2022-08-23 RX ORDER — ATORVASTATIN CALCIUM 20 MG/1
20 TABLET, FILM COATED ORAL DAILY
Qty: 90 TABLET | Refills: 1 | Status: SHIPPED | OUTPATIENT
Start: 2022-08-23

## 2022-08-23 ASSESSMENT — ENCOUNTER SYMPTOMS
VOMITING: 0
TROUBLE SWALLOWING: 0
SHORTNESS OF BREATH: 0
NAUSEA: 0
ABDOMINAL PAIN: 0
BLOOD IN STOOL: 0
WHEEZING: 0

## 2022-08-23 NOTE — PROGRESS NOTES
Son Granger (:  1980) is a 40 y.o. male,Established patient, here for evaluation of the following chief complaint(s):  Follow-up (HTN also relates that his feet feel like they are on fire after working all night)         ASSESSMENT/PLAN:  1. Left medial knee pain  Comments:  Knee x-ray. Consider NSAID vs. cortisone injection. Orders:  -     XR KNEE LEFT (3 VIEWS); Future  2. Dysesthesia affecting both sides of body  Comments:  Bilateral legs. CLIVE. Lipids. Orders:  -     Vascular CLIVE; Future  -     Lipid Panel; Future  -     Comprehensive Metabolic Panel; Future  3. Primary hypertension  Comments:  Continue nifedipine. Recheck blood pressure with office visit in 6 months. Weight loss discussed. Orders:  -     NIFEdipine (PROCARDIA XL) 30 MG extended release tablet; Take 1 tablet by mouth daily, Disp-90 tablet, R-1Normal  -     Lipid Panel; Future  -     Comprehensive Metabolic Panel; Future  4. Prediabetes  Comments:  Weight loss; diet discussed. Orders:  -     Comprehensive Metabolic Panel; Future      Return in about 6 months (around 2023) for HTN. Subjective   SUBJECTIVE/OBJECTIVE:  Patient c/o left medial knee pain x 2 months. No specific injury recently or remotely. Comes and goes with swelling. Worse with twisting motion. Pops and snaps. Never locks up. Never had x-ray. Ibuprofen tried w/o relief. Resolves after 12-24 hours. Right knee OK. Also c/o burning, numbness and tingling in legs below knees x 1 year. A1c 5.7 on 22. No muscle cramping. 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 HTN controlled with nifedipine. No SE's. Needs long term Rx. Obesity: gained 3 #'s since last OV. Has D/C'ed pop. Discussed increasing vegetables, decreasing carbs, and portion control. Review of Systems   Constitutional:  Positive for unexpected weight change. Negative for appetite change, fatigue and fever. 60 # past 5 years.    HENT: Negative for nosebleeds and trouble swallowing. Respiratory:  Negative for shortness of breath and wheezing. Cardiovascular:  Negative for chest pain and palpitations. Gastrointestinal:  Negative for abdominal pain, blood in stool, nausea and vomiting. Endocrine: Negative for polydipsia and polyuria. Genitourinary:  Negative for dysuria and hematuria. Musculoskeletal:  Positive for gait problem. Skin:  Negative for pallor and rash. Neurological:  Positive for numbness. Negative for dizziness, seizures, weakness, light-headedness and headaches. Hematological:  Negative for adenopathy. Does not bruise/bleed easily. Psychiatric/Behavioral:  Negative for dysphoric mood. The patient is not nervous/anxious. Objective   Physical Exam  Vitals reviewed. Constitutional:       General: He is not in acute distress. Appearance: He is obese. HENT:      Head: Normocephalic. Eyes:      Extraocular Movements: Extraocular movements intact. Pupils: Pupils are equal, round, and reactive to light. Cardiovascular:      Rate and Rhythm: Normal rate and regular rhythm. Pulses:           Dorsalis pedis pulses are 0 on the right side and 0 on the left side. Posterior tibial pulses are 0 on the right side and 0 on the left side. Heart sounds: No murmur heard. Comments: DP and PT questionably palpable bilaterally. Pulmonary:      Effort: Pulmonary effort is normal.      Breath sounds: Normal breath sounds. Musculoskeletal:         General: No swelling. Cervical back: Neck supple. Right lower leg: No edema. Left lower leg: No edema. Skin:     General: Skin is warm. Findings: No lesion or rash. Neurological:      General: No focal deficit present. Mental Status: He is alert and oriented to person, place, and time.    Psychiatric:         Mood and Affect: Mood normal.         Behavior: Behavior normal.              An electronic signature was used to authenticate this note.     --Jeniffer Figueroa MD

## 2022-08-25 ENCOUNTER — TELEPHONE (OUTPATIENT)
Dept: FAMILY MEDICINE CLINIC | Age: 42
End: 2022-08-25

## 2022-08-25 DIAGNOSIS — M17.12 LOCALIZED OSTEOARTHRITIS OF LEFT KNEE: Primary | ICD-10-CM

## 2022-08-25 RX ORDER — MELOXICAM 15 MG/1
15 TABLET ORAL DAILY
Qty: 30 TABLET | Refills: 1 | Status: SHIPPED | OUTPATIENT
Start: 2022-08-25

## 2022-08-25 NOTE — TELEPHONE ENCOUNTER
Patient states he saw Dr Charo Corbin this week and they discussed whether he wanted an injection or a prescription medication. He thought about it and wants to go ahead with a prescription medication. Uses [facility].

## 2022-11-28 ENCOUNTER — TELEPHONE (OUTPATIENT)
Dept: SLEEP CENTER | Age: 42
End: 2022-11-28

## 2022-12-02 DIAGNOSIS — M17.12 LOCALIZED OSTEOARTHRITIS OF LEFT KNEE: ICD-10-CM

## 2022-12-02 RX ORDER — MELOXICAM 15 MG/1
15 TABLET ORAL DAILY
Qty: 30 TABLET | Refills: 1 | Status: SHIPPED | OUTPATIENT
Start: 2022-12-02

## 2022-12-02 NOTE — TELEPHONE ENCOUNTER
Requested Prescriptions     Pending Prescriptions Disp Refills    meloxicam (MOBIC) 15 MG tablet 30 tablet 1     Sig: Take 1 tablet by mouth daily     Former pt of Dr. Madelaine Rivera.  Pt is establishing care with you on 2/20/2023 @ 9:30am.

## 2023-07-31 ENCOUNTER — OFFICE VISIT (OUTPATIENT)
Dept: FAMILY MEDICINE CLINIC | Age: 43
End: 2023-07-31
Payer: COMMERCIAL

## 2023-07-31 VITALS
HEART RATE: 87 BPM | SYSTOLIC BLOOD PRESSURE: 156 MMHG | BODY MASS INDEX: 36.73 KG/M2 | DIASTOLIC BLOOD PRESSURE: 104 MMHG | OXYGEN SATURATION: 95 % | HEIGHT: 74 IN | WEIGHT: 286.2 LBS

## 2023-07-31 DIAGNOSIS — R20.2 NUMBNESS AND TINGLING OF BOTH FEET: ICD-10-CM

## 2023-07-31 DIAGNOSIS — R20.0 NUMBNESS AND TINGLING OF BOTH FEET: ICD-10-CM

## 2023-07-31 DIAGNOSIS — E66.01 MORBID OBESITY DUE TO EXCESS CALORIES (HCC): ICD-10-CM

## 2023-07-31 DIAGNOSIS — G47.33 OSA (OBSTRUCTIVE SLEEP APNEA): ICD-10-CM

## 2023-07-31 DIAGNOSIS — Z76.89 ENCOUNTER TO ESTABLISH CARE: ICD-10-CM

## 2023-07-31 DIAGNOSIS — E78.2 MIXED HYPERLIPIDEMIA: ICD-10-CM

## 2023-07-31 DIAGNOSIS — G89.29 CHRONIC PAIN OF LEFT KNEE: ICD-10-CM

## 2023-07-31 DIAGNOSIS — R73.01 IFG (IMPAIRED FASTING GLUCOSE): Primary | ICD-10-CM

## 2023-07-31 DIAGNOSIS — I10 PRIMARY HYPERTENSION: ICD-10-CM

## 2023-07-31 DIAGNOSIS — R53.82 CHRONIC FATIGUE: ICD-10-CM

## 2023-07-31 DIAGNOSIS — M25.562 CHRONIC PAIN OF LEFT KNEE: ICD-10-CM

## 2023-07-31 DIAGNOSIS — Z72.0 TOBACCO USE: ICD-10-CM

## 2023-07-31 PROBLEM — R73.03 PREDIABETES: Status: RESOLVED | Noted: 2022-06-22 | Resolved: 2023-07-31

## 2023-07-31 PROBLEM — S11.21XA ESOPHAGEAL TEAR: Status: RESOLVED | Noted: 2022-06-22 | Resolved: 2023-07-31

## 2023-07-31 LAB — HBA1C MFR BLD: 5.7 %

## 2023-07-31 PROCEDURE — 3080F DIAST BP >= 90 MM HG: CPT | Performed by: NURSE PRACTITIONER

## 2023-07-31 PROCEDURE — 83037 HB GLYCOSYLATED A1C HOME DEV: CPT | Performed by: NURSE PRACTITIONER

## 2023-07-31 PROCEDURE — 99214 OFFICE O/P EST MOD 30 MIN: CPT | Performed by: NURSE PRACTITIONER

## 2023-07-31 PROCEDURE — 3077F SYST BP >= 140 MM HG: CPT | Performed by: NURSE PRACTITIONER

## 2023-07-31 RX ORDER — ATORVASTATIN CALCIUM 20 MG/1
20 TABLET, FILM COATED ORAL DAILY
Qty: 90 TABLET | Refills: 3 | Status: SHIPPED | OUTPATIENT
Start: 2023-07-31

## 2023-07-31 RX ORDER — LISINOPRIL AND HYDROCHLOROTHIAZIDE 12.5; 1 MG/1; MG/1
1 TABLET ORAL DAILY
Qty: 30 TABLET | Refills: 1 | Status: SHIPPED | OUTPATIENT
Start: 2023-07-31

## 2023-07-31 SDOH — ECONOMIC STABILITY: HOUSING INSECURITY
IN THE LAST 12 MONTHS, WAS THERE A TIME WHEN YOU DID NOT HAVE A STEADY PLACE TO SLEEP OR SLEPT IN A SHELTER (INCLUDING NOW)?: NO

## 2023-07-31 SDOH — ECONOMIC STABILITY: FOOD INSECURITY: WITHIN THE PAST 12 MONTHS, YOU WORRIED THAT YOUR FOOD WOULD RUN OUT BEFORE YOU GOT MONEY TO BUY MORE.: NEVER TRUE

## 2023-07-31 SDOH — ECONOMIC STABILITY: FOOD INSECURITY: WITHIN THE PAST 12 MONTHS, THE FOOD YOU BOUGHT JUST DIDN'T LAST AND YOU DIDN'T HAVE MONEY TO GET MORE.: NEVER TRUE

## 2023-07-31 SDOH — ECONOMIC STABILITY: INCOME INSECURITY: HOW HARD IS IT FOR YOU TO PAY FOR THE VERY BASICS LIKE FOOD, HOUSING, MEDICAL CARE, AND HEATING?: NOT HARD AT ALL

## 2023-07-31 ASSESSMENT — PATIENT HEALTH QUESTIONNAIRE - PHQ9
2. FEELING DOWN, DEPRESSED OR HOPELESS: 0
SUM OF ALL RESPONSES TO PHQ9 QUESTIONS 1 & 2: 0
SUM OF ALL RESPONSES TO PHQ QUESTIONS 1-9: 0
1. LITTLE INTEREST OR PLEASURE IN DOING THINGS: 0
SUM OF ALL RESPONSES TO PHQ QUESTIONS 1-9: 0

## 2023-07-31 ASSESSMENT — ENCOUNTER SYMPTOMS
WHEEZING: 0
CONSTIPATION: 0
ABDOMINAL PAIN: 0
EYES NEGATIVE: 1
ORTHOPNEA: 0
NAUSEA: 0
DIARRHEA: 0
COUGH: 0
SHORTNESS OF BREATH: 0

## 2023-07-31 NOTE — ASSESSMENT & PLAN NOTE
Well-controlled, lifestyle modifications recommended.    Hemoglobin A1C   Date Value Ref Range Status   07/31/2023 5.7 % Final

## 2023-07-31 NOTE — ASSESSMENT & PLAN NOTE
Start diclofenac 50 mg bid with meals. Discussed going to physical therapy.  He would like to try the medication first.

## 2023-07-31 NOTE — ASSESSMENT & PLAN NOTE
Uncontrolled, start lisinopril/HCTZ as discussed. Instructed patient to monitor blood pressure at home and keep a log. Increase water and activity level, decrease salt in diet.

## 2023-07-31 NOTE — ASSESSMENT & PLAN NOTE
Well-controlled, continue current medications and lifestyle modifications recommended.    Hemoglobin A1C   Date Value Ref Range Status   07/31/2023 5.7 % Final

## 2023-07-31 NOTE — PROGRESS NOTES
heart sounds. Pulmonary:      Effort: Pulmonary effort is normal.      Breath sounds: Normal breath sounds. No wheezing. Musculoskeletal:      Cervical back: Neck supple. Right lower leg: No edema. Left lower leg: No edema. Comments: Knee exam: left positive for moderate crepitations, some mild tenderness and pain on range of motion, no effusion is present, no pseudo laxity noted. Lymphadenopathy:      Cervical: No cervical adenopathy. Skin:     Capillary Refill: Capillary refill takes less than 2 seconds. Neurological:      Mental Status: He is alert and oriented to person, place, and time. Motor: No weakness. Gait: Gait abnormal (antalgic). Psychiatric:         Mood and Affect: Mood normal.         Behavior: Behavior is cooperative. Please note that this chart was generated using voice recognition Dragon dictation software. Although every effort was made to ensure the accuracy of this automated transcription, some errors in transcription may have occurred.     Electronically signed by BRITTANY Rivera CNP on 7/31/2023 at 9:15 AM.

## 2023-08-14 ENCOUNTER — OFFICE VISIT (OUTPATIENT)
Dept: FAMILY MEDICINE CLINIC | Age: 43
End: 2023-08-14
Payer: COMMERCIAL

## 2023-08-14 VITALS
BODY MASS INDEX: 36.24 KG/M2 | HEART RATE: 89 BPM | SYSTOLIC BLOOD PRESSURE: 132 MMHG | DIASTOLIC BLOOD PRESSURE: 94 MMHG | OXYGEN SATURATION: 99 % | WEIGHT: 279.4 LBS | TEMPERATURE: 99.9 F

## 2023-08-14 DIAGNOSIS — M25.562 CHRONIC PAIN OF LEFT KNEE: ICD-10-CM

## 2023-08-14 DIAGNOSIS — K12.2 ORAL INFECTION: ICD-10-CM

## 2023-08-14 DIAGNOSIS — I10 PRIMARY HYPERTENSION: Primary | ICD-10-CM

## 2023-08-14 DIAGNOSIS — G89.29 CHRONIC PAIN OF LEFT KNEE: ICD-10-CM

## 2023-08-14 PROCEDURE — 3075F SYST BP GE 130 - 139MM HG: CPT | Performed by: NURSE PRACTITIONER

## 2023-08-14 PROCEDURE — 99213 OFFICE O/P EST LOW 20 MIN: CPT | Performed by: NURSE PRACTITIONER

## 2023-08-14 PROCEDURE — 3080F DIAST BP >= 90 MM HG: CPT | Performed by: NURSE PRACTITIONER

## 2023-08-14 RX ORDER — AMOXICILLIN 875 MG/1
875 TABLET, COATED ORAL 2 TIMES DAILY
Qty: 20 TABLET | Refills: 0 | Status: SHIPPED | OUTPATIENT
Start: 2023-08-14 | End: 2023-08-24

## 2023-08-14 ASSESSMENT — ENCOUNTER SYMPTOMS
COUGH: 0
WHEEZING: 0
SHORTNESS OF BREATH: 0

## 2023-08-14 NOTE — ASSESSMENT & PLAN NOTE
Borderline controlled, continue current medications. Tolerating the new blood pressure medication without known side effects.

## 2023-08-14 NOTE — PROGRESS NOTES
[address] [facility]  [address] [facility], [address] [facility] [place], [address]  Dept: [phone]  Dept Fax: [phone]    Date of Service:  8/14/2023    Jeff Barone is a 44 y.o. male who presents in office today with Self. Chief Complaint   Patient presents with    Hypertension     Has not been monitoring at home    Dental Pain     C/o having pain since yesterday on right side        Diagnoses / Plan:   1. Primary hypertension  Assessment & Plan:   Borderline controlled, continue current medications. Tolerating the new blood pressure medication without known side effects. 2. Oral infection  Comments:  Start amoxicillin as discussed; call dentist today for evaluation of infection. Orders:  -     amoxicillin (AMOXIL) 875 MG tablet; Take 1 tablet by mouth 2 times daily for 10 days, Disp-20 tablet, R-0Normal  3. Chronic pain of left knee  Assessment & Plan:   Doing well with the diclofenac, pain is tolerable. Difficult to evaluate the blood pressure today due to the new oral infection. Instructed to schedule with dentist, rest and drink plenty of water. May take Tylenol if needed for fever/pain. Reviewed medication desired effects, potential side effects, and how to take the medication. Encouraged healthy diet and routine exercise. Instructed to continue current medications. All patient questions answered. Patient voiced understanding. Return in about 2 weeks (around 8/28/2023) for HTN, oral infection. Subjective:   History of Present Illness:  Presents for 2 week follow up of hypertension. Nifedipine discontinued and Lisinopril/HCTZ started. Diclofenac started for chronic pain of the left knee. The diclofenac is helping the left knee, decreased by at least 1/3. He is most concerned for a new oral pain. Symptoms started yesterday morning. States he woke up with it yesterday.  Pain is described as throbbing, started on the botton right and has worsened spreading to the

## 2023-09-23 DIAGNOSIS — I10 PRIMARY HYPERTENSION: ICD-10-CM

## 2023-09-23 DIAGNOSIS — R73.01 IFG (IMPAIRED FASTING GLUCOSE): ICD-10-CM

## 2023-09-25 RX ORDER — LISINOPRIL AND HYDROCHLOROTHIAZIDE 12.5; 1 MG/1; MG/1
1 TABLET ORAL DAILY
Qty: 30 TABLET | Refills: 5 | Status: SHIPPED | OUTPATIENT
Start: 2023-09-25 | End: 2023-10-10 | Stop reason: SDUPTHER

## 2023-09-25 NOTE — TELEPHONE ENCOUNTER
Jorge Martinez is calling to request a refill on the following medication(s):  Requested Prescriptions     Pending Prescriptions Disp Refills    lisinopril-hydroCHLOROthiazide (PRINZIDE;ZESTORETIC) 10-12.5 MG per tablet [Pharmacy Med Name: LISINOPRIL-HCTZ 10-12.5 MG TAB] 30 tablet 1     Sig: take 1 tablet by mouth once daily       Last Visit Date (If Applicable):  9/69/9508    Next Visit Date:    Visit date not found

## 2023-10-10 ENCOUNTER — OFFICE VISIT (OUTPATIENT)
Dept: FAMILY MEDICINE CLINIC | Age: 43
End: 2023-10-10

## 2023-10-10 VITALS
BODY MASS INDEX: 36.37 KG/M2 | SYSTOLIC BLOOD PRESSURE: 120 MMHG | HEART RATE: 102 BPM | OXYGEN SATURATION: 96 % | DIASTOLIC BLOOD PRESSURE: 78 MMHG | WEIGHT: 280.4 LBS

## 2023-10-10 DIAGNOSIS — N50.89 SWELLING OF LEFT TESTICLE: ICD-10-CM

## 2023-10-10 DIAGNOSIS — R73.01 IFG (IMPAIRED FASTING GLUCOSE): ICD-10-CM

## 2023-10-10 DIAGNOSIS — I10 PRIMARY HYPERTENSION: Primary | ICD-10-CM

## 2023-10-10 DIAGNOSIS — E78.2 MIXED HYPERLIPIDEMIA: ICD-10-CM

## 2023-10-10 RX ORDER — ATORVASTATIN CALCIUM 20 MG/1
20 TABLET, FILM COATED ORAL DAILY
Qty: 90 TABLET | Refills: 3 | Status: SHIPPED | OUTPATIENT
Start: 2023-10-10

## 2023-10-10 RX ORDER — LISINOPRIL AND HYDROCHLOROTHIAZIDE 12.5; 1 MG/1; MG/1
1 TABLET ORAL DAILY
Qty: 90 TABLET | Refills: 3 | Status: SHIPPED | OUTPATIENT
Start: 2023-10-10

## 2023-10-10 ASSESSMENT — ENCOUNTER SYMPTOMS
ABDOMINAL PAIN: 0
COUGH: 0
NAUSEA: 0
VOMITING: 1
SWOLLEN GLANDS: 0
WHEEZING: 0
CHANGE IN BOWEL HABIT: 0
SHORTNESS OF BREATH: 0

## 2023-10-10 NOTE — PROGRESS NOTES
rhythm. Tachycardia present. Heart sounds: Normal heart sounds. Pulmonary:      Effort: Pulmonary effort is normal.      Breath sounds: Normal breath sounds. No wheezing. Genitourinary:     Penis: Normal and circumcised. Testes:         Left: Swelling and testicular hydrocele present. Musculoskeletal:      Cervical back: Neck supple. Right lower leg: No edema. Left lower leg: No edema. Lymphadenopathy:      Lower Body: No right inguinal adenopathy. No left inguinal adenopathy. Skin:     Capillary Refill: Capillary refill takes less than 2 seconds. Neurological:      Mental Status: He is alert and oriented to person, place, and time. Gait: Gait normal.   Psychiatric:         Behavior: Behavior is cooperative. Please note that this chart was generated using voice recognition Dragon dictation software. Although every effort was made to ensure the accuracy of this automated transcription, some errors in transcription may have occurred.     Electronically signed by BRITTANY Nuñez CNP on 10/15/2023 at 8:27 PM.

## 2023-10-12 DIAGNOSIS — G89.29 CHRONIC PAIN OF LEFT KNEE: ICD-10-CM

## 2023-10-12 DIAGNOSIS — M25.562 CHRONIC PAIN OF LEFT KNEE: ICD-10-CM

## 2023-10-12 NOTE — TELEPHONE ENCOUNTER
Jackelin Cardenas is requesting a refill on the following medication(s):  Requested Prescriptions     Pending Prescriptions Disp Refills    diclofenac (VOLTAREN) 50 MG EC tablet 60 tablet 1     Sig: Take 1 tablet by mouth 2 times daily       Last Visit Date (If Applicable):  74/83/3416    Next Visit Date:    Visit date not found

## 2023-10-17 DIAGNOSIS — N50.3 CYST OF EPIDIDYMIS DETERMINED BY ULTRASOUND: Primary | ICD-10-CM

## 2023-10-30 ENCOUNTER — TELEPHONE (OUTPATIENT)
Dept: FAMILY MEDICINE CLINIC | Age: 43
End: 2023-10-30

## 2023-10-30 NOTE — TELEPHONE ENCOUNTER
----- Message from Eliecer Fu sent at 10/27/2023 12:33 PM EDT -----  Subject: Message to Provider    QUESTIONS  Information for Provider? patient returning call he received unable to   warm transfer to office.    ---------------------------------------------------------------------------  --------------  Antonietta Arce [facility]  [phone]; OK to leave message on voicemail  ---------------------------------------------------------------------------  --------------  SCRIPT ANSWERS  undefined

## 2023-12-13 ENCOUNTER — OFFICE VISIT (OUTPATIENT)
Dept: UROLOGY | Age: 43
End: 2023-12-13
Payer: COMMERCIAL

## 2023-12-13 VITALS
HEART RATE: 103 BPM | DIASTOLIC BLOOD PRESSURE: 84 MMHG | SYSTOLIC BLOOD PRESSURE: 132 MMHG | HEIGHT: 74 IN | WEIGHT: 288 LBS | BODY MASS INDEX: 36.96 KG/M2

## 2023-12-13 DIAGNOSIS — N43.3 HYDROCELE IN ADULT: Primary | ICD-10-CM

## 2023-12-13 PROCEDURE — 3079F DIAST BP 80-89 MM HG: CPT | Performed by: UROLOGY

## 2023-12-13 PROCEDURE — 99204 OFFICE O/P NEW MOD 45 MIN: CPT | Performed by: UROLOGY

## 2023-12-13 PROCEDURE — 3075F SYST BP GE 130 - 139MM HG: CPT | Performed by: UROLOGY

## 2023-12-13 NOTE — PROGRESS NOTES
Sammie Rice MD   Urology Clinic office Visit      Patient:  Dale Avila  YOB: 1980  Date: 12/13/2023    HISTORY OF PRESENT ILLNESS:   The patient is a 44 y.o. male who presents today for evaluation of the following problem(s):      1. Hydrocele in adult           Overall the problem(s) : are worsening. Associated Symptoms: No dysuria, gross hematuria. Pain Severity:      Summary of old records: N/A  (Patient's old records, notes and chart reviewed and summarized above.)      Onset 1 year  Severity is described as left scrotal swelling. enlargement  Noted to have a cyst on US  7 cm in size  Denies bother or pain. I independently reviewed and verified the images and reports from:    10/2023      The right testicle measures sagittally 4.34 cm and the left 4.94 cm. Both show uniform echogenicity and normal vascularity on Doppler imaging. Neither epididymal head is enlarged or hyperemic. There are small bilateral varicoceles bilaterally. There is a large well-circumscribed, totally anechoic structure within the left hemiscrotum   measuring approximately 7 x 6 x 4 cm in dimension. Its appearance is benign. Last several PSA's:  No results found for: \"PSA\"    Last total testosterone:  No results found for: \"TESTOSTERONE\"    Urinalysis today:  No results found for this visit on 12/13/23. Last BUN and creatinine:  Lab Results   Component Value Date    BUN 9 08/23/2022     Lab Results   Component Value Date    CREATININE 0.7 08/23/2022       Imaging Reviewed during this Office Visit:   (results were independently reviewed by physician and radiology report verified)    PAST MEDICAL, FAMILY AND SOCIAL HISTORY:  Past Medical History:   Diagnosis Date    Esophageal tear 6/22/2022     Past Surgical History:   Procedure Laterality Date    HERNIA REPAIR  05/2017    left inguinal hernia repair.  Dr Lexus Romero  06/2017    recurrent left inguinal

## 2024-02-09 DIAGNOSIS — M25.562 CHRONIC PAIN OF LEFT KNEE: ICD-10-CM

## 2024-02-09 DIAGNOSIS — G89.29 CHRONIC PAIN OF LEFT KNEE: ICD-10-CM

## 2024-02-09 NOTE — TELEPHONE ENCOUNTER
Thuan Calzada is requesting a refill on the following medication(s):  Requested Prescriptions     Pending Prescriptions Disp Refills    diclofenac (VOLTAREN) 50 MG EC tablet 60 tablet 1     Sig: Take 1 tablet by mouth 2 times daily       Last Visit Date (If Applicable):  10/10/2023    Next Visit Date:    4/10/2024

## 2024-04-16 ENCOUNTER — OFFICE VISIT (OUTPATIENT)
Dept: FAMILY MEDICINE CLINIC | Age: 44
End: 2024-04-16
Payer: COMMERCIAL

## 2024-04-16 VITALS
WEIGHT: 298.8 LBS | DIASTOLIC BLOOD PRESSURE: 80 MMHG | BODY MASS INDEX: 38.36 KG/M2 | OXYGEN SATURATION: 94 % | SYSTOLIC BLOOD PRESSURE: 112 MMHG | HEART RATE: 92 BPM

## 2024-04-16 DIAGNOSIS — Z72.0 TOBACCO USE: ICD-10-CM

## 2024-04-16 DIAGNOSIS — K21.9 GASTROESOPHAGEAL REFLUX DISEASE, UNSPECIFIED WHETHER ESOPHAGITIS PRESENT: ICD-10-CM

## 2024-04-16 DIAGNOSIS — R53.82 CHRONIC FATIGUE: ICD-10-CM

## 2024-04-16 DIAGNOSIS — E66.01 MORBID OBESITY DUE TO EXCESS CALORIES (HCC): ICD-10-CM

## 2024-04-16 DIAGNOSIS — E78.2 MIXED HYPERLIPIDEMIA: ICD-10-CM

## 2024-04-16 DIAGNOSIS — R73.01 IFG (IMPAIRED FASTING GLUCOSE): ICD-10-CM

## 2024-04-16 DIAGNOSIS — I10 PRIMARY HYPERTENSION: Primary | ICD-10-CM

## 2024-04-16 DIAGNOSIS — G47.33 OSA (OBSTRUCTIVE SLEEP APNEA): ICD-10-CM

## 2024-04-16 PROCEDURE — 3074F SYST BP LT 130 MM HG: CPT | Performed by: NURSE PRACTITIONER

## 2024-04-16 PROCEDURE — 99214 OFFICE O/P EST MOD 30 MIN: CPT | Performed by: NURSE PRACTITIONER

## 2024-04-16 PROCEDURE — 3079F DIAST BP 80-89 MM HG: CPT | Performed by: NURSE PRACTITIONER

## 2024-04-16 RX ORDER — VALSARTAN AND HYDROCHLOROTHIAZIDE 160; 12.5 MG/1; MG/1
1 TABLET, FILM COATED ORAL DAILY
Qty: 30 TABLET | Refills: 2 | Status: SHIPPED | OUTPATIENT
Start: 2024-04-16

## 2024-04-16 ASSESSMENT — PATIENT HEALTH QUESTIONNAIRE - PHQ9
SUM OF ALL RESPONSES TO PHQ QUESTIONS 1-9: 0
SUM OF ALL RESPONSES TO PHQ9 QUESTIONS 1 & 2: 0
SUM OF ALL RESPONSES TO PHQ QUESTIONS 1-9: 0
2. FEELING DOWN, DEPRESSED OR HOPELESS: NOT AT ALL
SUM OF ALL RESPONSES TO PHQ QUESTIONS 1-9: 0
1. LITTLE INTEREST OR PLEASURE IN DOING THINGS: NOT AT ALL
SUM OF ALL RESPONSES TO PHQ QUESTIONS 1-9: 0

## 2024-04-16 NOTE — PROGRESS NOTES
[address], [address]  [place], [place] [address]  Dept: [phone]  Dept Fax: [phone]      Patient:  Thuan Calzada  YOB: 1980  Date of Service:  4/16/2024    Chief Complaint   Patient presents with    6 Month Follow-Up     Denies any issues or complaints        DIAGNOSIS/PLAN:     1. Primary hypertension  Assessment & Plan:   Well-controlled, continue current medications  Orders:  -     valsartan-hydroCHLOROthiazide (DIOVAN-HCT) 160-12.5 MG per tablet; Take 1 tablet by mouth daily, Disp-30 tablet, R-2Normal  2. IFG (impaired fasting glucose)  Assessment & Plan:   Well-controlled, continue current medications  Hemoglobin A1C   Date Value Ref Range Status   07/31/2023 5.7 % Final     Orders:  -     Hemoglobin A1C; Future  -     valsartan-hydroCHLOROthiazide (DIOVAN-HCT) 160-12.5 MG per tablet; Take 1 tablet by mouth daily, Disp-30 tablet, R-2Normal  3. Mixed hyperlipidemia  Assessment & Plan:   Unclear control, continue current medications, medication adherence emphasized, and lifestyle modifications recommended.  4. Morbid obesity due to excess calories (HCC)  Assessment & Plan:   Uncontrolled, continue current medications and lifestyle modifications recommended  5. Tobacco use  Assessment & Plan:   Unclear control, continue current medications and lifestyle modifications recommended. Vaping  6. BARBARA (obstructive sleep apnea)  Assessment & Plan:  Unclear control, states never had a sleep study, he does snore, chronic fatigue. Does not want a sleep study.     7. Chronic fatigue  Assessment & Plan:   Unclear control, continue current medications and lifestyle modifications recommended. Declines sleep study  8. Gastroesophageal reflux disease, unspecified whether esophagitis present  Assessment & Plan:   Well-controlled, continue current medications     Instructed to complete previously ordered labs from 7/2023. Reviewed and interpreted all

## 2024-04-28 PROBLEM — K21.9 GASTROESOPHAGEAL REFLUX DISEASE: Status: ACTIVE | Noted: 2024-04-28

## 2024-04-28 PROBLEM — R20.2 NUMBNESS AND TINGLING OF BOTH FEET: Status: RESOLVED | Noted: 2023-07-31 | Resolved: 2024-04-28

## 2024-04-28 PROBLEM — R20.0 NUMBNESS AND TINGLING OF BOTH FEET: Status: RESOLVED | Noted: 2023-07-31 | Resolved: 2024-04-28

## 2024-05-28 DIAGNOSIS — M25.562 CHRONIC PAIN OF LEFT KNEE: ICD-10-CM

## 2024-05-28 DIAGNOSIS — G89.29 CHRONIC PAIN OF LEFT KNEE: ICD-10-CM

## 2024-05-28 NOTE — TELEPHONE ENCOUNTER
Thuan Calzada is calling to request a refill on the following medication(s):  Requested Prescriptions     Pending Prescriptions Disp Refills    diclofenac (VOLTAREN) 50 MG EC tablet [Pharmacy Med Name: DICLOFENAC SOD EC 50 MG TAB] 60 tablet 1     Sig: take 1 tablet by mouth twice a day       Last Visit Date (If Applicable):  4/16/2024    Next Visit Date:    10/16/2024

## 2024-07-08 DIAGNOSIS — R73.01 IFG (IMPAIRED FASTING GLUCOSE): ICD-10-CM

## 2024-07-08 DIAGNOSIS — I10 PRIMARY HYPERTENSION: ICD-10-CM

## 2024-07-08 RX ORDER — VALSARTAN AND HYDROCHLOROTHIAZIDE 160; 12.5 MG/1; MG/1
1 TABLET, FILM COATED ORAL DAILY
Qty: 30 TABLET | Refills: 5 | Status: SHIPPED | OUTPATIENT
Start: 2024-07-08

## 2024-07-08 NOTE — TELEPHONE ENCOUNTER
Thuan Calzada is calling to request a refill on the following medication(s):  Requested Prescriptions     Pending Prescriptions Disp Refills    valsartan-hydroCHLOROthiazide (DIOVAN-HCT) 160-12.5 MG per tablet [Pharmacy Med Name: VALSARTAN-HCTZ 160-12.5 MG TAB] 30 tablet 2     Sig: take 1 tablet by mouth once daily       Last Visit Date (If Applicable):  4/16/2024    Next Visit Date:    10/16/2024

## 2024-08-17 DIAGNOSIS — G89.29 CHRONIC PAIN OF LEFT KNEE: ICD-10-CM

## 2024-08-17 DIAGNOSIS — M25.562 CHRONIC PAIN OF LEFT KNEE: ICD-10-CM

## 2024-08-19 NOTE — TELEPHONE ENCOUNTER
Thuan Calzada is calling to request a refill on the following medication(s):  Requested Prescriptions     Pending Prescriptions Disp Refills    diclofenac (VOLTAREN) 50 MG EC tablet [Pharmacy Med Name: Diclofenac Sodium 50 MG Oral Tablet Delayed Release] 60 tablet 0     Sig: Take 1 tablet by mouth twice daily       Last Visit Date (If Applicable):  4/16/2024    Next Visit Date:    10/16/2024

## 2024-09-20 DIAGNOSIS — G89.29 CHRONIC PAIN OF LEFT KNEE: ICD-10-CM

## 2024-09-20 DIAGNOSIS — M25.562 CHRONIC PAIN OF LEFT KNEE: ICD-10-CM

## 2024-10-31 ENCOUNTER — OFFICE VISIT (OUTPATIENT)
Dept: FAMILY MEDICINE CLINIC | Age: 44
End: 2024-10-31
Payer: COMMERCIAL

## 2024-10-31 VITALS
SYSTOLIC BLOOD PRESSURE: 132 MMHG | HEART RATE: 92 BPM | DIASTOLIC BLOOD PRESSURE: 96 MMHG | BODY MASS INDEX: 41.62 KG/M2 | WEIGHT: 315 LBS | OXYGEN SATURATION: 94 %

## 2024-10-31 DIAGNOSIS — I10 PRIMARY HYPERTENSION: Primary | ICD-10-CM

## 2024-10-31 DIAGNOSIS — R73.03 PREDIABETES: ICD-10-CM

## 2024-10-31 LAB — HBA1C MFR BLD: 5.6 %

## 2024-10-31 PROCEDURE — 83036 HEMOGLOBIN GLYCOSYLATED A1C: CPT | Performed by: STUDENT IN AN ORGANIZED HEALTH CARE EDUCATION/TRAINING PROGRAM

## 2024-10-31 PROCEDURE — 99214 OFFICE O/P EST MOD 30 MIN: CPT | Performed by: STUDENT IN AN ORGANIZED HEALTH CARE EDUCATION/TRAINING PROGRAM

## 2024-10-31 PROCEDURE — 3075F SYST BP GE 130 - 139MM HG: CPT | Performed by: STUDENT IN AN ORGANIZED HEALTH CARE EDUCATION/TRAINING PROGRAM

## 2024-10-31 PROCEDURE — 3080F DIAST BP >= 90 MM HG: CPT | Performed by: STUDENT IN AN ORGANIZED HEALTH CARE EDUCATION/TRAINING PROGRAM

## 2024-10-31 RX ORDER — LISINOPRIL AND HYDROCHLOROTHIAZIDE 12.5; 2 MG/1; MG/1
1 TABLET ORAL DAILY
Qty: 90 TABLET | Refills: 1 | Status: SHIPPED | OUTPATIENT
Start: 2024-10-31

## 2024-10-31 RX ORDER — LISINOPRIL AND HYDROCHLOROTHIAZIDE 10; 12.5 MG/1; MG/1
1 TABLET ORAL DAILY
COMMUNITY
Start: 2024-07-24 | End: 2024-10-31 | Stop reason: ALTCHOICE

## 2024-10-31 SDOH — ECONOMIC STABILITY: FOOD INSECURITY: WITHIN THE PAST 12 MONTHS, THE FOOD YOU BOUGHT JUST DIDN'T LAST AND YOU DIDN'T HAVE MONEY TO GET MORE.: NEVER TRUE

## 2024-10-31 SDOH — ECONOMIC STABILITY: FOOD INSECURITY: WITHIN THE PAST 12 MONTHS, YOU WORRIED THAT YOUR FOOD WOULD RUN OUT BEFORE YOU GOT MONEY TO BUY MORE.: NEVER TRUE

## 2024-10-31 SDOH — ECONOMIC STABILITY: INCOME INSECURITY: HOW HARD IS IT FOR YOU TO PAY FOR THE VERY BASICS LIKE FOOD, HOUSING, MEDICAL CARE, AND HEATING?: NOT VERY HARD

## 2024-10-31 NOTE — PROGRESS NOTES
social history.  Updates were made as necessary.    Electronically signed by Antoine Dahl MD on 11/2/2024 at 9:11 PM

## 2024-11-04 DIAGNOSIS — R73.01 IFG (IMPAIRED FASTING GLUCOSE): ICD-10-CM

## 2024-11-04 DIAGNOSIS — I10 PRIMARY HYPERTENSION: ICD-10-CM

## 2024-11-04 DIAGNOSIS — E78.2 MIXED HYPERLIPIDEMIA: ICD-10-CM

## 2024-11-04 NOTE — TELEPHONE ENCOUNTER
Thuan Calzada is requesting a refill on the following medication(s):  Requested Prescriptions     Pending Prescriptions Disp Refills    atorvastatin (LIPITOR) 20 MG tablet 90 tablet 3     Sig: Take 1 tablet by mouth daily       Last Visit Date (If Applicable):  10/31/2024    Next Visit Date:    11/19/2024

## 2024-11-06 RX ORDER — ATORVASTATIN CALCIUM 20 MG/1
20 TABLET, FILM COATED ORAL DAILY
Qty: 90 TABLET | Refills: 0 | Status: SHIPPED | OUTPATIENT
Start: 2024-11-06

## 2024-12-01 DIAGNOSIS — G89.29 CHRONIC PAIN OF LEFT KNEE: ICD-10-CM

## 2024-12-01 DIAGNOSIS — M25.562 CHRONIC PAIN OF LEFT KNEE: ICD-10-CM

## 2024-12-02 NOTE — TELEPHONE ENCOUNTER
Thuan Calzada is requesting a refill on the following medication(s):  Requested Prescriptions     Pending Prescriptions Disp Refills    diclofenac (VOLTAREN) 50 MG EC tablet [Pharmacy Med Name: Diclofenac Sodium 50 MG Oral Tablet Delayed Release] 60 tablet 0     Sig: Take 1 tablet by mouth twice daily       Last Visit Date (If Applicable):  10/31/2024    Next Visit Date:    12/23/2024

## 2024-12-23 ENCOUNTER — OFFICE VISIT (OUTPATIENT)
Dept: FAMILY MEDICINE CLINIC | Age: 44
End: 2024-12-23
Payer: COMMERCIAL

## 2024-12-23 VITALS
SYSTOLIC BLOOD PRESSURE: 128 MMHG | HEART RATE: 88 BPM | OXYGEN SATURATION: 95 % | DIASTOLIC BLOOD PRESSURE: 88 MMHG | BODY MASS INDEX: 40.43 KG/M2 | HEIGHT: 74 IN | WEIGHT: 315 LBS

## 2024-12-23 DIAGNOSIS — G89.29 CHRONIC PAIN OF RIGHT ANKLE: ICD-10-CM

## 2024-12-23 DIAGNOSIS — I10 PRIMARY HYPERTENSION: Primary | ICD-10-CM

## 2024-12-23 DIAGNOSIS — B35.1 ONYCHOMYCOSIS: ICD-10-CM

## 2024-12-23 DIAGNOSIS — M25.571 CHRONIC PAIN OF RIGHT ANKLE: ICD-10-CM

## 2024-12-23 LAB
ANION GAP SERPL CALCULATED.3IONS-SCNC: 14.4 MMOL/L (ref 3–11)
BUN BLDV-MCNC: 13 MG/DL (ref 9–20)
CALCIUM SERPL-MCNC: 9.2 MG/DL (ref 8.4–10.2)
CHLORIDE BLD-SCNC: 99 MMOL/L (ref 98–120)
CO2: 24 MMOL/L (ref 22–31)
CREAT SERPL-MCNC: 0.8 MG/DL (ref 0.7–1.3)
CREATININE, RANDOM URINE: 27.6 MG/DL (ref 20–370)
GFR, ESTIMATED: > 60
GLUCOSE: 125 MG/DL (ref 75–110)
MICROALBUMIN/CREAT 24H UR: < 0.6 MG/DL (ref 0–1.7)
POTASSIUM SERPL-SCNC: 3.7 MMOL/L (ref 3.6–5)
SODIUM BLD-SCNC: 137 MMOL/L (ref 135–145)

## 2024-12-23 PROCEDURE — 99213 OFFICE O/P EST LOW 20 MIN: CPT | Performed by: STUDENT IN AN ORGANIZED HEALTH CARE EDUCATION/TRAINING PROGRAM

## 2024-12-23 PROCEDURE — 3079F DIAST BP 80-89 MM HG: CPT | Performed by: STUDENT IN AN ORGANIZED HEALTH CARE EDUCATION/TRAINING PROGRAM

## 2024-12-23 PROCEDURE — 3074F SYST BP LT 130 MM HG: CPT | Performed by: STUDENT IN AN ORGANIZED HEALTH CARE EDUCATION/TRAINING PROGRAM

## 2024-12-23 NOTE — PROGRESS NOTES
[address], [address]  [place] [address]  Phone: [phone]  Fax: [phone]      Date of Visit:  24  Patient Name: Thuan Calzada   Patient :  1980     ASSESSMENT/PLAN     1. Primary hypertension  2. Chronic pain of right ankle  -     XR ANKLE RIGHT (MIN 3 VIEWS); Future  3. Onychomycosis     Blood pressures improved.  Continue lisinopril hydrochlorothiazide at current dosing.    Patient does have chronic pain of his right ankle.  Will order x-ray as above.  No acute injury.  Exercises for ankle strengthening provided.    Over-the-counter options for onychomycosis given to patient.    Follow up in 6 months for blood pressure or sooner if needed    - Questions/concerns answered. Patient verbalized and expressed understanding. Medications, laboratory testing, imaging, consultation, and follow up as documented in this encounter.       HPI:     Thuan Calzada is a 44 y.o. male with   Patient Active Problem List   Diagnosis    Hypertension    Prediabetes    Morbid obesity due to excess calories    Tobacco use    BARBARA (obstructive sleep apnea)    Chronic pain of left knee    Mixed hyperlipidemia    Chronic fatigue    IFG (impaired fasting glucose)    Gastroesophageal reflux disease     who presents today to discuss   Chief Complaint   Patient presents with    Follow-up     Has not checked blood pressure since last office visit.      Ankle Pain     Right ankle has been sprained and is swollen, rates the pain 8 out of 10 when walking.         Nail Problem     Toenail on right great toe has fungus under and tried OTC. Toe nail fell off completely and new growth is black.       HPI: Patient presents for follow-up today.  He has not checked his blood pressure since last office visit, however has been feeling better.  Pressure today within normal limits.  Patient agreeable to continue current medications.    Patient has had chronic right ankle

## 2025-01-04 DIAGNOSIS — G89.29 CHRONIC PAIN OF LEFT KNEE: ICD-10-CM

## 2025-01-04 DIAGNOSIS — M25.562 CHRONIC PAIN OF LEFT KNEE: ICD-10-CM

## 2025-01-06 NOTE — TELEPHONE ENCOUNTER
Thuan Calzada is calling to request a refill on the following medication(s):  Requested Prescriptions     Pending Prescriptions Disp Refills    diclofenac (VOLTAREN) 50 MG EC tablet [Pharmacy Med Name: Diclofenac Sodium 50 MG Oral Tablet Delayed Release] 60 tablet 5     Sig: Take 1 tablet by mouth twice daily       Last Visit Date (If Applicable):  12/23/2024    Next Visit Date:    6/24/2025

## 2025-01-31 DIAGNOSIS — E78.2 MIXED HYPERLIPIDEMIA: ICD-10-CM

## 2025-01-31 DIAGNOSIS — I10 PRIMARY HYPERTENSION: ICD-10-CM

## 2025-01-31 DIAGNOSIS — R73.01 IFG (IMPAIRED FASTING GLUCOSE): ICD-10-CM

## 2025-01-31 RX ORDER — ATORVASTATIN CALCIUM 20 MG/1
20 TABLET, FILM COATED ORAL DAILY
Qty: 90 TABLET | Refills: 1 | Status: SHIPPED | OUTPATIENT
Start: 2025-01-31

## 2025-01-31 NOTE — TELEPHONE ENCOUNTER
Thuan Calzada is requesting a refill on the following medication(s):  Requested Prescriptions     Pending Prescriptions Disp Refills    atorvastatin (LIPITOR) 20 MG tablet [Pharmacy Med Name: Atorvastatin Calcium 20 MG Oral Tablet] 90 tablet 0     Sig: Take 1 tablet by mouth once daily       Last Visit Date (If Applicable):  12/23/2024    Next Visit Date:    6/24/2025

## 2025-05-02 DIAGNOSIS — I10 PRIMARY HYPERTENSION: ICD-10-CM

## 2025-05-05 NOTE — TELEPHONE ENCOUNTER
Thuan Calzada is calling to request a refill on the following medication(s):  Requested Prescriptions     Pending Prescriptions Disp Refills    lisinopril-hydroCHLOROthiazide (PRINZIDE;ZESTORETIC) 20-12.5 MG per tablet [Pharmacy Med Name: Lisinopril-hydroCHLOROthiazide 20-12.5 MG Oral Tablet] 90 tablet 3     Sig: Take 1 tablet by mouth once daily       Last Visit Date (If Applicable):  12/23/2024    Next Visit Date:    6/24/2025

## 2025-05-06 RX ORDER — LISINOPRIL AND HYDROCHLOROTHIAZIDE 12.5; 2 MG/1; MG/1
1 TABLET ORAL DAILY
Qty: 90 TABLET | Refills: 1 | Status: SHIPPED | OUTPATIENT
Start: 2025-05-06

## 2025-05-13 ENCOUNTER — PATIENT MESSAGE (OUTPATIENT)
Dept: FAMILY MEDICINE CLINIC | Age: 45
End: 2025-05-13

## 2025-06-09 ENCOUNTER — OFFICE VISIT (OUTPATIENT)
Dept: FAMILY MEDICINE CLINIC | Age: 45
End: 2025-06-09
Payer: COMMERCIAL

## 2025-06-09 VITALS
SYSTOLIC BLOOD PRESSURE: 130 MMHG | DIASTOLIC BLOOD PRESSURE: 82 MMHG | WEIGHT: 283.6 LBS | OXYGEN SATURATION: 99 % | HEART RATE: 80 BPM | TEMPERATURE: 97.4 F | RESPIRATION RATE: 18 BRPM | BODY MASS INDEX: 36.4 KG/M2

## 2025-06-09 DIAGNOSIS — R73.01 IFG (IMPAIRED FASTING GLUCOSE): ICD-10-CM

## 2025-06-09 DIAGNOSIS — I10 PRIMARY HYPERTENSION: Primary | ICD-10-CM

## 2025-06-09 DIAGNOSIS — M62.838 TRAPEZIUS MUSCLE SPASM: ICD-10-CM

## 2025-06-09 DIAGNOSIS — G47.33 OSA (OBSTRUCTIVE SLEEP APNEA): ICD-10-CM

## 2025-06-09 DIAGNOSIS — Z72.0 TOBACCO USE: ICD-10-CM

## 2025-06-09 DIAGNOSIS — R53.82 CHRONIC FATIGUE: ICD-10-CM

## 2025-06-09 DIAGNOSIS — F10.10 ALCOHOL ABUSE: ICD-10-CM

## 2025-06-09 DIAGNOSIS — K21.9 GASTROESOPHAGEAL REFLUX DISEASE, UNSPECIFIED WHETHER ESOPHAGITIS PRESENT: ICD-10-CM

## 2025-06-09 DIAGNOSIS — R20.2 NUMBNESS AND TINGLING OF BOTH LOWER EXTREMITIES: ICD-10-CM

## 2025-06-09 DIAGNOSIS — E66.01 MORBID OBESITY DUE TO EXCESS CALORIES (HCC): ICD-10-CM

## 2025-06-09 DIAGNOSIS — R20.0 NUMBNESS AND TINGLING OF BOTH LOWER EXTREMITIES: ICD-10-CM

## 2025-06-09 DIAGNOSIS — E78.2 MIXED HYPERLIPIDEMIA: ICD-10-CM

## 2025-06-09 PROBLEM — G89.29 CHRONIC PAIN OF LEFT KNEE: Status: RESOLVED | Noted: 2022-08-23 | Resolved: 2025-06-09

## 2025-06-09 PROBLEM — M25.562 CHRONIC PAIN OF LEFT KNEE: Status: RESOLVED | Noted: 2022-08-23 | Resolved: 2025-06-09

## 2025-06-09 LAB
ALBUMIN/GLOBULIN RATIO: 1.7 G/DL
ALBUMIN: 4.5 G/DL (ref 3.5–5)
ALP BLD-CCNC: 87 UNITS/L (ref 38–126)
ALT SERPL-CCNC: 28 UNITS/L (ref 4–50)
ANION GAP SERPL CALCULATED.3IONS-SCNC: 10.9 MMOL/L (ref 3–11)
AST SERPL-CCNC: 25 UNITS/L (ref 17–59)
BASOPHILS ABSOLUTE: 0.13 X10E3/?L (ref 0–0.3)
BASOPHILS RELATIVE PERCENT: 1.66 % (ref 0–3)
BILIRUB SERPL-MCNC: 0.6 MG/DL (ref 0.2–1.3)
BILIRUBIN DIRECT: 0 MG/DL (ref 0–0.3)
BUN BLDV-MCNC: 27 MG/DL (ref 9–20)
CALCIUM SERPL-MCNC: 9.6 MG/DL (ref 8.4–10.2)
CHLORIDE BLD-SCNC: 103 MMOL/L (ref 98–120)
CHOLESTEROL, TOTAL: 112 MG/DL (ref 50–200)
CHOLESTEROL/HDL RATIO: 3.73 RATIO (ref 0–4.5)
CO2: 24 MMOL/L (ref 22–31)
CREAT SERPL-MCNC: 1.2 MG/DL (ref 0.7–1.3)
CREATININE, RANDOM URINE: 188.1 MG/DL (ref 20–370)
EOSINOPHILS ABSOLUTE: 0.16 X10E3/?L (ref 0–1.1)
EOSINOPHILS RELATIVE PERCENT: 2.12 % (ref 0–10)
GFR, ESTIMATED: > 60
GLOBULIN: 2.6 G/DL
GLUCOSE: 92 MG/DL (ref 75–110)
HBA1C MFR BLD: 5.6 %
HCT VFR BLD CALC: 46.8 % (ref 42–52)
HDLC SERPL-MCNC: 30 MG/DL (ref 36–68)
HEMOGLOBIN: 15.1 G/DL (ref 13.8–17.8)
LDL CHOLESTEROL: 53.6 MG/DL (ref 0–160)
LYMPHOCYTES ABSOLUTE: 1.37 X10E3/?L (ref 1–5.5)
LYMPHOCYTES RELATIVE PERCENT: 18.06 % (ref 20–51.1)
MCH RBC QN AUTO: 29.4 PG (ref 28.5–32.5)
MCHC RBC AUTO-ENTMCNC: 32.3 G/DL (ref 32–37)
MCV RBC AUTO: 91.2 FL (ref 80–94)
MICROALBUMIN/CREAT 24H UR: 1.1 MG/DL (ref 0–1.7)
MICROALBUMIN/CREAT UR-RTO: 5.84
MONOCYTES ABSOLUTE: 0.86 X10E3/?L (ref 0.1–1)
MONOCYTES RELATIVE PERCENT: 11.32 % (ref 1.7–9.3)
NEUTROPHILS ABSOLUTE: 5.06 X10E3/?L (ref 2–8.1)
NEUTROPHILS RELATIVE PERCENT: 66.83 % (ref 42.2–75.2)
PDW BLD-RTO: 11.6 % (ref 10–15.5)
PLATELET # BLD: 216.6 THOU/MM3 (ref 130–400)
POTASSIUM SERPL-SCNC: 4.5 MMOL/L (ref 3.6–5)
RBC # BLD: 5.13 M/UL (ref 4.7–6.1)
SODIUM BLD-SCNC: 138 MMOL/L (ref 135–145)
TOTAL PROTEIN: 7 G/DL (ref 6.3–8.2)
TRIGL SERPL-MCNC: 142 MG/DL (ref 10–250)
VLDLC SERPL CALC-MCNC: 28 MG/DL (ref 0–50)
WBC # BLD: 7.6 THOU/ML3 (ref 4.8–10.8)

## 2025-06-09 PROCEDURE — 3075F SYST BP GE 130 - 139MM HG: CPT | Performed by: NURSE PRACTITIONER

## 2025-06-09 PROCEDURE — 99214 OFFICE O/P EST MOD 30 MIN: CPT | Performed by: NURSE PRACTITIONER

## 2025-06-09 PROCEDURE — 3079F DIAST BP 80-89 MM HG: CPT | Performed by: NURSE PRACTITIONER

## 2025-06-09 PROCEDURE — 83036 HEMOGLOBIN GLYCOSYLATED A1C: CPT | Performed by: NURSE PRACTITIONER

## 2025-06-09 RX ORDER — CYCLOBENZAPRINE HCL 10 MG
10 TABLET ORAL NIGHTLY PRN
Qty: 10 TABLET | Refills: 0 | Status: SHIPPED | OUTPATIENT
Start: 2025-06-09 | End: 2025-06-19

## 2025-06-09 RX ORDER — GABAPENTIN 300 MG/1
300 CAPSULE ORAL 2 TIMES DAILY
Qty: 60 CAPSULE | Refills: 1 | Status: SHIPPED | OUTPATIENT
Start: 2025-06-09 | End: 2025-08-08

## 2025-06-09 SDOH — ECONOMIC STABILITY: FOOD INSECURITY: WITHIN THE PAST 12 MONTHS, THE FOOD YOU BOUGHT JUST DIDN'T LAST AND YOU DIDN'T HAVE MONEY TO GET MORE.: NEVER TRUE

## 2025-06-09 SDOH — ECONOMIC STABILITY: FOOD INSECURITY: WITHIN THE PAST 12 MONTHS, YOU WORRIED THAT YOUR FOOD WOULD RUN OUT BEFORE YOU GOT MONEY TO BUY MORE.: NEVER TRUE

## 2025-06-09 ASSESSMENT — PATIENT HEALTH QUESTIONNAIRE - PHQ9
1. LITTLE INTEREST OR PLEASURE IN DOING THINGS: NOT AT ALL
SUM OF ALL RESPONSES TO PHQ QUESTIONS 1-9: 0
2. FEELING DOWN, DEPRESSED OR HOPELESS: NOT AT ALL
SUM OF ALL RESPONSES TO PHQ QUESTIONS 1-9: 0

## 2025-06-09 NOTE — PROGRESS NOTES
depression, 20-27 = Severe depression     OBJECTIVE:     Vitals:    06/09/25 0849   BP: 130/82   BP Site: Left Upper Arm   Patient Position: Sitting   BP Cuff Size: Large Adult   Pulse: 80   Resp: 18   Temp: 97.4 °F (36.3 °C)   SpO2: 99%   Weight: 128.6 kg (283 lb 9.6 oz)      Estimated body mass index is 36.4 kg/m² as calculated from the following:    Height as of 12/23/24: 1.88 m (6' 2.02\").    Weight as of this encounter: 128.6 kg (283 lb 9.6 oz).     Physical Exam  Constitutional:       Appearance: Normal appearance. He is well-developed and well-groomed. He is obese.   HENT:      Head: Normocephalic.   Eyes:      Conjunctiva/sclera: Conjunctivae normal.   Neck:      Thyroid: No thyromegaly.   Cardiovascular:      Rate and Rhythm: Normal rate and regular rhythm.      Heart sounds: Normal heart sounds.   Pulmonary:      Effort: Pulmonary effort is normal.      Breath sounds: Normal breath sounds. No wheezing.   Musculoskeletal:      Cervical back: Neck supple.      Right lower leg: No edema.      Left lower leg: No edema.   Skin:     Capillary Refill: Capillary refill takes less than 2 seconds.   Neurological:      Mental Status: He is alert and oriented to person, place, and time.      Gait: Gait normal.   Psychiatric:         Mood and Affect: Mood normal.         Behavior: Behavior is cooperative.          The patient (or guardian, if applicable) and other individuals in attendance with the patient were advised that Artificial Intelligence will be utilized during this visit to record, process the conversation to generate a clinical note, and support improvement of the AI technology. The patient (or guardian, if applicable) and other individuals in attendance at the appointment consented to the use of AI, including the recording.      Electronically signed by BRITTANY Del Real CNP on 6/9/2025

## 2025-06-12 ENCOUNTER — RESULTS FOLLOW-UP (OUTPATIENT)
Dept: FAMILY MEDICINE CLINIC | Age: 45
End: 2025-06-12

## 2025-07-08 ENCOUNTER — OFFICE VISIT (OUTPATIENT)
Dept: FAMILY MEDICINE CLINIC | Age: 45
End: 2025-07-08
Payer: COMMERCIAL

## 2025-07-08 VITALS
OXYGEN SATURATION: 98 % | WEIGHT: 282.2 LBS | HEART RATE: 76 BPM | DIASTOLIC BLOOD PRESSURE: 76 MMHG | BODY MASS INDEX: 36.22 KG/M2 | SYSTOLIC BLOOD PRESSURE: 114 MMHG

## 2025-07-08 DIAGNOSIS — R20.2 NUMBNESS AND TINGLING OF BOTH FEET: Primary | ICD-10-CM

## 2025-07-08 DIAGNOSIS — I10 PRIMARY HYPERTENSION: ICD-10-CM

## 2025-07-08 DIAGNOSIS — E78.2 MIXED HYPERLIPIDEMIA: ICD-10-CM

## 2025-07-08 DIAGNOSIS — R20.0 NUMBNESS AND TINGLING OF BOTH FEET: Primary | ICD-10-CM

## 2025-07-08 PROCEDURE — 3074F SYST BP LT 130 MM HG: CPT | Performed by: NURSE PRACTITIONER

## 2025-07-08 PROCEDURE — 3078F DIAST BP <80 MM HG: CPT | Performed by: NURSE PRACTITIONER

## 2025-07-08 PROCEDURE — 99214 OFFICE O/P EST MOD 30 MIN: CPT | Performed by: NURSE PRACTITIONER

## 2025-07-08 NOTE — PROGRESS NOTES
[address], [address]  [place] [id number]  Dept: [phone]  Dept Fax: [phone]    Date of Service:  7/8/2025    Thuan Calzada is a 45 y.o. male who presents today for his medical conditions/complaints as noted below.      Chief Complaint   Patient presents with    Medication Check     1 mo f/u was recently started on gabapentin and flexeril reports that has not noticed much of a change in symptoms      DIAGNOSIS / PLAN:     Assessment & Plan  1. Back pain: Improved.  - Gabapentin will continue twice a day; consider dosage increased. EMG pending.     2. Burning sensation in feet.  - Nerve test results from [facility] are pending, called for results.  - If results are normal, a B12 level test will be recommended.  - Consider increasing gabapentin to three times a day.     3. Cholesterol management.  - Cholesterol levels have significantly improved: triglycerides reduced from 401 to 142, total cholesterol from 202 to 112, and LDL levels significantly lowered.  - HDL levels have slightly decreased; advised to increase physical activity to improve HDL levels.  - Overall risk ratio has significantly decreased.    5. Blood pressure management.  - Blood pressure has improved, currently at 114/76 compared to the previous reading of 130/82.  - Weight loss noted, indicating positive progress.    Follow-up  - Follow-up in 6 months.  - Pending nerve test results will be communicated via phone or MyChart.       Numbness and tingling of both feet  Primary hypertension  Mixed hyperlipidemia       Discussed the purpose, benefits, and potential side effects of the prescribed medications in detail. Addressed all of the patient's questions, and the patient expressed clear understanding. Reviewed health maintenance recommendations and advised the patient to continue their current medications, diet, and exercise regimen. The patient agreed with the proposed treatment plan.

## 2025-07-10 DIAGNOSIS — R20.2 NUMBNESS AND TINGLING OF BOTH LOWER EXTREMITIES: ICD-10-CM

## 2025-07-10 DIAGNOSIS — R20.0 NUMBNESS AND TINGLING OF BOTH LOWER EXTREMITIES: ICD-10-CM

## 2025-07-10 RX ORDER — GABAPENTIN 300 MG/1
300 CAPSULE ORAL 3 TIMES DAILY
Qty: 90 CAPSULE | Refills: 1 | Status: SHIPPED | OUTPATIENT
Start: 2025-07-10 | End: 2025-09-08

## 2025-07-29 DIAGNOSIS — R73.01 IFG (IMPAIRED FASTING GLUCOSE): ICD-10-CM

## 2025-07-29 DIAGNOSIS — I10 PRIMARY HYPERTENSION: ICD-10-CM

## 2025-07-29 DIAGNOSIS — E78.2 MIXED HYPERLIPIDEMIA: ICD-10-CM

## 2025-07-29 RX ORDER — ATORVASTATIN CALCIUM 20 MG/1
20 TABLET, FILM COATED ORAL DAILY
Qty: 90 TABLET | Refills: 3 | Status: SHIPPED | OUTPATIENT
Start: 2025-07-29

## 2025-07-29 NOTE — TELEPHONE ENCOUNTER
Thuan Calzada is requesting a refill on the following medication(s):  Requested Prescriptions     Pending Prescriptions Disp Refills    atorvastatin (LIPITOR) 20 MG tablet [Pharmacy Med Name: Atorvastatin Calcium 20 MG Oral Tablet] 90 tablet 3     Sig: Take 1 tablet by mouth once daily       Last Visit Date (If Applicable):  7/8/2025      Next Visit Date:    1/6/2026

## 2025-09-04 DIAGNOSIS — M25.562 CHRONIC PAIN OF LEFT KNEE: ICD-10-CM

## 2025-09-04 DIAGNOSIS — G89.29 CHRONIC PAIN OF LEFT KNEE: ICD-10-CM

## (undated) DEVICE — SUTURE VCRL SZ 0 L27IN ABSRB VLT L26MM CT-2 1/2 CIR J334H

## (undated) DEVICE — GOWN,AURORA,NONRNF,XL,30/CS: Brand: MEDLINE

## (undated) DEVICE — TUBING FLTR PLUME AWAY EVAC W/ SUCT DEV DISP PUREVIEW

## (undated) DEVICE — Z INACTIVE USE 2641839 CLIP INT M L POLYMER LOK LIG HEM O LOK

## (undated) DEVICE — GLOVE SURG SZ 6 THK91MIL LTX FREE SYN POLYISOPRENE ANTI

## (undated) DEVICE — INSTRUMENT WARMER: Brand: SCOPE WARMER DISPOSABLE SEAL

## (undated) DEVICE — TROCAR: Brand: KII FIOS FIRST ENTRY

## (undated) DEVICE — TROCAR: Brand: KII® SLEEVE

## (undated) DEVICE — GLOVE SURG SZ 65 THK91MIL LTX FREE SYN POLYISOPRENE

## (undated) DEVICE — SUTURE VCRL SZ 4-0 L18IN ABSRB UD L19MM PS-2 3/8 CIR PRIM J496H

## (undated) DEVICE — GLOVE SURG SZ 65 L12IN THK91MIL BRN LTX FREE

## (undated) DEVICE — STAPLER SKIN L39MM DIA0.53MM CRWN 5.7MM S STL FIX HD PROX

## (undated) DEVICE — SUTURE PROL SZ 2-0 L30IN NONABSORBABLE BLU L26MM SH 1/2 CIR 8833H

## (undated) DEVICE — LAP CHOLE PK

## (undated) DEVICE — PACK SURG PROC REMINDER N WVN DISPOSABLE BEAC TIME OUT

## (undated) DEVICE — 3M™ IOBAN™ 2 ANTIMICROBIAL INCISE DRAPE 6650EZ: Brand: IOBAN™ 2

## (undated) DEVICE — TROCAR: Brand: KII SHIELDED BLADED ACCESS SYSTEM

## (undated) DEVICE — CHLORAPREP 26ML ORANGE

## (undated) DEVICE — SCISSOR SURG CRV ENDOCUT TIP FOR LAP DISP

## (undated) DEVICE — GLOVE ORANGE PI 7   MSG9070

## (undated) DEVICE — COVER LT HNDL BLU PLAS

## (undated) DEVICE — STAPLER ENDOSCP 5MM ABS FIX DEV W/ 30 TACKS DISP

## (undated) DEVICE — ELECTRODE ES L3IN S STL BLDE INSUL DISP VALLEYLAB EDGE

## (undated) DEVICE — CONVERTED USE 248063 TOWELS OR BL ST

## (undated) DEVICE — GAUZE,SPONGE,2"X2",8PLY,STERILE,LF,2'S: Brand: MEDLINE

## (undated) DEVICE — TRAY URIN CATH 16FR DRNGE BG STATLOK STBL DEV F SURSTP

## (undated) DEVICE — INTENDED FOR TISSUE SEPARATION, AND OTHER PROCEDURES THAT REQUIRE A SHARP SURGICAL BLADE TO PUNCTURE OR CUT.: Brand: BARD-PARKER ® CARBON RIB-BACK BLADES

## (undated) DEVICE — APPLIER SUT CLP FOR 2-0 3-0 4-0 VCRL ABSRB SUT

## (undated) DEVICE — 3M™ TEGADERM™ TRANSPARENT FILM DRESSING FIRST AID STYLE, 1620, 2-3/8 IN X 2-3/4 IN, 100 BAGS/CARTON 6 CARTONS/CASE: Brand: 3M™ TEGADERM™

## (undated) DEVICE — Z DISCONTINUED USE 2624853 GLOVE SURG SZ 75 L12IN THK91MIL BRN LTX FREE

## (undated) DEVICE — 3M™ WARMING BLANKET, UPPER BODY, 10 PER CASE, 42268: Brand: BAIR HUGGER™

## (undated) DEVICE — APPLIER CLP M/L SHFT DIA5MM 15 LIG LIGAMAX 5